# Patient Record
Sex: MALE | NOT HISPANIC OR LATINO | URBAN - METROPOLITAN AREA
[De-identification: names, ages, dates, MRNs, and addresses within clinical notes are randomized per-mention and may not be internally consistent; named-entity substitution may affect disease eponyms.]

---

## 2019-01-04 ENCOUNTER — INPATIENT (INPATIENT)
Facility: HOSPITAL | Age: 64
LOS: 11 days | Discharge: HOME | DRG: 955 | End: 2019-01-16
Attending: NEUROLOGICAL SURGERY | Admitting: PSYCHIATRY & NEUROLOGY
Payer: MEDICAID

## 2019-01-04 VITALS
DIASTOLIC BLOOD PRESSURE: 94 MMHG | OXYGEN SATURATION: 97 % | TEMPERATURE: 102 F | RESPIRATION RATE: 30 BRPM | SYSTOLIC BLOOD PRESSURE: 161 MMHG | HEART RATE: 92 BPM

## 2019-01-04 DIAGNOSIS — I61.9 NONTRAUMATIC INTRACEREBRAL HEMORRHAGE, UNSPECIFIED: ICD-10-CM

## 2019-01-04 LAB
ALBUMIN SERPL ELPH-MCNC: 4 G/DL — SIGNIFICANT CHANGE UP (ref 3.3–5)
ALP SERPL-CCNC: 41 U/L — SIGNIFICANT CHANGE UP (ref 40–120)
ALT FLD-CCNC: 44 U/L — SIGNIFICANT CHANGE UP (ref 10–45)
AMMONIA BLD-MCNC: 56 UMOL/L — HIGH (ref 11–55)
AMPHET UR-MCNC: NEGATIVE — SIGNIFICANT CHANGE UP
ANION GAP SERPL CALC-SCNC: 13 MMOL/L — SIGNIFICANT CHANGE UP (ref 5–17)
ANION GAP SERPL CALC-SCNC: 17 MMOL/L — SIGNIFICANT CHANGE UP (ref 5–17)
APPEARANCE UR: ABNORMAL
APTT BLD: 29.7 SEC — SIGNIFICANT CHANGE UP (ref 27.5–36.3)
AST SERPL-CCNC: 132 U/L — HIGH (ref 10–40)
BARBITURATES UR SCN-MCNC: NEGATIVE — SIGNIFICANT CHANGE UP
BENZODIAZ UR-MCNC: POSITIVE
BILIRUB DIRECT SERPL-MCNC: 0.3 MG/DL — HIGH (ref 0–0.2)
BILIRUB INDIRECT FLD-MCNC: 1.6 MG/DL — HIGH (ref 0.2–1)
BILIRUB SERPL-MCNC: 1.9 MG/DL — HIGH (ref 0.2–1.2)
BILIRUB UR-MCNC: NEGATIVE — SIGNIFICANT CHANGE UP
BLD GP AB SCN SERPL QL: NEGATIVE — SIGNIFICANT CHANGE UP
BUN SERPL-MCNC: 11 MG/DL — SIGNIFICANT CHANGE UP (ref 7–23)
BUN SERPL-MCNC: 12 MG/DL — SIGNIFICANT CHANGE UP (ref 7–23)
CALCIUM SERPL-MCNC: 8.4 MG/DL — SIGNIFICANT CHANGE UP (ref 8.4–10.5)
CALCIUM SERPL-MCNC: 8.4 MG/DL — SIGNIFICANT CHANGE UP (ref 8.4–10.5)
CHLORIDE SERPL-SCNC: 89 MMOL/L — LOW (ref 96–108)
CHLORIDE SERPL-SCNC: 90 MMOL/L — LOW (ref 96–108)
CK MB BLD-MCNC: 0.4 % — SIGNIFICANT CHANGE UP (ref 0–3.5)
CK MB CFR SERPL CALC: 10.9 NG/ML — HIGH (ref 0–6.7)
CK SERPL-CCNC: 2500 U/L — HIGH (ref 30–200)
CO2 SERPL-SCNC: 20 MMOL/L — LOW (ref 22–31)
CO2 SERPL-SCNC: 21 MMOL/L — LOW (ref 22–31)
COCAINE METAB.OTHER UR-MCNC: NEGATIVE — SIGNIFICANT CHANGE UP
COLOR SPEC: YELLOW — SIGNIFICANT CHANGE UP
CREAT SERPL-MCNC: 0.56 MG/DL — SIGNIFICANT CHANGE UP (ref 0.5–1.3)
CREAT SERPL-MCNC: 0.66 MG/DL — SIGNIFICANT CHANGE UP (ref 0.5–1.3)
DIFF PNL FLD: ABNORMAL
GLUCOSE SERPL-MCNC: 118 MG/DL — HIGH (ref 70–99)
GLUCOSE SERPL-MCNC: 131 MG/DL — HIGH (ref 70–99)
GLUCOSE UR QL: ABNORMAL
HCT VFR BLD CALC: 43.6 % — SIGNIFICANT CHANGE UP (ref 39–50)
HGB BLD-MCNC: 15.4 G/DL — SIGNIFICANT CHANGE UP (ref 13–17)
INR BLD: 1.06 RATIO — SIGNIFICANT CHANGE UP (ref 0.88–1.16)
KETONES UR-MCNC: ABNORMAL
LEUKOCYTE ESTERASE UR-ACNC: ABNORMAL
MAGNESIUM SERPL-MCNC: 2.1 MG/DL — SIGNIFICANT CHANGE UP (ref 1.6–2.6)
MCHC RBC-ENTMCNC: 33.6 PG — SIGNIFICANT CHANGE UP (ref 27–34)
MCHC RBC-ENTMCNC: 35.2 GM/DL — SIGNIFICANT CHANGE UP (ref 32–36)
MCV RBC AUTO: 95.3 FL — SIGNIFICANT CHANGE UP (ref 80–100)
METHADONE UR-MCNC: NEGATIVE — SIGNIFICANT CHANGE UP
NITRITE UR-MCNC: NEGATIVE — SIGNIFICANT CHANGE UP
OPIATES UR-MCNC: NEGATIVE — SIGNIFICANT CHANGE UP
OSMOLALITY UR: 780 MOS/KG — SIGNIFICANT CHANGE UP (ref 300–900)
OXYCODONE UR-MCNC: NEGATIVE — SIGNIFICANT CHANGE UP
PCP SPEC-MCNC: SIGNIFICANT CHANGE UP
PCP UR-MCNC: NEGATIVE — SIGNIFICANT CHANGE UP
PH UR: 6.5 — SIGNIFICANT CHANGE UP (ref 5–8)
PHOSPHATE SERPL-MCNC: 3.4 MG/DL — SIGNIFICANT CHANGE UP (ref 2.5–4.5)
PLATELET # BLD AUTO: 179 K/UL — SIGNIFICANT CHANGE UP (ref 150–400)
POTASSIUM SERPL-MCNC: 4 MMOL/L — SIGNIFICANT CHANGE UP (ref 3.5–5.3)
POTASSIUM SERPL-MCNC: 7.2 MMOL/L — CRITICAL HIGH (ref 3.5–5.3)
POTASSIUM SERPL-SCNC: 4 MMOL/L — SIGNIFICANT CHANGE UP (ref 3.5–5.3)
POTASSIUM SERPL-SCNC: 7.2 MMOL/L — CRITICAL HIGH (ref 3.5–5.3)
PROT SERPL-MCNC: 8.1 G/DL — SIGNIFICANT CHANGE UP (ref 6–8.3)
PROT UR-MCNC: ABNORMAL
PROTHROM AB SERPL-ACNC: 12.2 SEC — SIGNIFICANT CHANGE UP (ref 10–12.9)
RBC # BLD: 4.57 M/UL — SIGNIFICANT CHANGE UP (ref 4.2–5.8)
RBC # FLD: 12.2 % — SIGNIFICANT CHANGE UP (ref 10.3–14.5)
RH IG SCN BLD-IMP: POSITIVE — SIGNIFICANT CHANGE UP
RH IG SCN BLD-IMP: POSITIVE — SIGNIFICANT CHANGE UP
SODIUM SERPL-SCNC: 122 MMOL/L — LOW (ref 135–145)
SODIUM SERPL-SCNC: 128 MMOL/L — LOW (ref 135–145)
SODIUM UR-SCNC: 146 MMOL/L — SIGNIFICANT CHANGE UP
SP GR SPEC: 1.03 — HIGH (ref 1.01–1.02)
THC UR QL: NEGATIVE — SIGNIFICANT CHANGE UP
TROPONIN T, HIGH SENSITIVITY RESULT: 20 NG/L — SIGNIFICANT CHANGE UP (ref 0–51)
UROBILINOGEN FLD QL: ABNORMAL
WBC # BLD: 13.3 K/UL — HIGH (ref 3.8–10.5)
WBC # FLD AUTO: 13.3 K/UL — HIGH (ref 3.8–10.5)

## 2019-01-04 PROCEDURE — 99291 CRITICAL CARE FIRST HOUR: CPT

## 2019-01-04 PROCEDURE — 71045 X-RAY EXAM CHEST 1 VIEW: CPT | Mod: 26

## 2019-01-04 PROCEDURE — 70450 CT HEAD/BRAIN W/O DYE: CPT | Mod: 26

## 2019-01-04 PROCEDURE — 71250 CT THORAX DX C-: CPT | Mod: 26

## 2019-01-04 PROCEDURE — 36620 INSERTION CATHETER ARTERY: CPT

## 2019-01-04 PROCEDURE — 99292 CRITICAL CARE ADDL 30 MIN: CPT | Mod: 25

## 2019-01-04 PROCEDURE — 72125 CT NECK SPINE W/O DYE: CPT | Mod: 26

## 2019-01-04 PROCEDURE — 74176 CT ABD & PELVIS W/O CONTRAST: CPT | Mod: 26

## 2019-01-04 RX ORDER — MIDAZOLAM HYDROCHLORIDE 1 MG/ML
4 INJECTION, SOLUTION INTRAMUSCULAR; INTRAVENOUS ONCE
Qty: 0 | Refills: 0 | Status: DISCONTINUED | OUTPATIENT
Start: 2019-01-04 | End: 2019-01-04

## 2019-01-04 RX ORDER — FENTANYL CITRATE 50 UG/ML
50 INJECTION INTRAVENOUS ONCE
Qty: 0 | Refills: 0 | Status: DISCONTINUED | OUTPATIENT
Start: 2019-01-04 | End: 2019-01-04

## 2019-01-04 RX ORDER — LACOSAMIDE 50 MG/1
100 TABLET ORAL EVERY 12 HOURS
Qty: 0 | Refills: 0 | Status: DISCONTINUED | OUTPATIENT
Start: 2019-01-04 | End: 2019-01-06

## 2019-01-04 RX ORDER — SODIUM CHLORIDE 5 G/100ML
1000 INJECTION, SOLUTION INTRAVENOUS
Qty: 0 | Refills: 0 | Status: DISCONTINUED | OUTPATIENT
Start: 2019-01-04 | End: 2019-01-06

## 2019-01-04 RX ORDER — PROPOFOL 10 MG/ML
5 INJECTION, EMULSION INTRAVENOUS
Qty: 500 | Refills: 0 | Status: DISCONTINUED | OUTPATIENT
Start: 2019-01-04 | End: 2019-01-05

## 2019-01-04 RX ORDER — CEFAZOLIN SODIUM 1 G
2000 VIAL (EA) INJECTION ONCE
Qty: 0 | Refills: 0 | Status: COMPLETED | OUTPATIENT
Start: 2019-01-04 | End: 2019-01-04

## 2019-01-04 RX ORDER — ACETAMINOPHEN 500 MG
1000 TABLET ORAL ONCE
Qty: 0 | Refills: 0 | Status: COMPLETED | OUTPATIENT
Start: 2019-01-04 | End: 2019-01-04

## 2019-01-04 RX ORDER — CHLORHEXIDINE GLUCONATE 213 G/1000ML
1 SOLUTION TOPICAL
Qty: 0 | Refills: 0 | Status: DISCONTINUED | OUTPATIENT
Start: 2019-01-04 | End: 2019-01-07

## 2019-01-04 RX ORDER — DEXMEDETOMIDINE HYDROCHLORIDE IN 0.9% SODIUM CHLORIDE 4 UG/ML
0.7 INJECTION INTRAVENOUS
Qty: 200 | Refills: 0 | Status: DISCONTINUED | OUTPATIENT
Start: 2019-01-04 | End: 2019-01-05

## 2019-01-04 RX ORDER — VALPROIC ACID (AS SODIUM SALT) 250 MG/5ML
500 SOLUTION, ORAL ORAL EVERY 8 HOURS
Qty: 0 | Refills: 0 | Status: DISCONTINUED | OUTPATIENT
Start: 2019-01-04 | End: 2019-01-04

## 2019-01-04 RX ORDER — SODIUM CHLORIDE 9 MG/ML
1000 INJECTION INTRAMUSCULAR; INTRAVENOUS; SUBCUTANEOUS
Qty: 0 | Refills: 0 | Status: DISCONTINUED | OUTPATIENT
Start: 2019-01-04 | End: 2019-01-04

## 2019-01-04 RX ORDER — NICARDIPINE HYDROCHLORIDE 30 MG/1
5 CAPSULE, EXTENDED RELEASE ORAL
Qty: 40 | Refills: 0 | Status: DISCONTINUED | OUTPATIENT
Start: 2019-01-04 | End: 2019-01-05

## 2019-01-04 RX ADMIN — Medication 400 MILLIGRAM(S): at 17:45

## 2019-01-04 RX ADMIN — CHLORHEXIDINE GLUCONATE 1 APPLICATION(S): 213 SOLUTION TOPICAL at 22:24

## 2019-01-04 RX ADMIN — SODIUM CHLORIDE 75 MILLILITER(S): 5 INJECTION, SOLUTION INTRAVENOUS at 21:10

## 2019-01-04 RX ADMIN — FENTANYL CITRATE 50 MICROGRAM(S): 50 INJECTION INTRAVENOUS at 20:15

## 2019-01-04 RX ADMIN — Medication 27.5 MILLIGRAM(S): at 18:00

## 2019-01-04 RX ADMIN — LACOSAMIDE 120 MILLIGRAM(S): 50 TABLET ORAL at 21:11

## 2019-01-04 RX ADMIN — Medication 100 MILLIGRAM(S): at 20:15

## 2019-01-04 NOTE — PROGRESS NOTE ADULT - SUBJECTIVE AND OBJECTIVE BOX
SUMMARY:   63 year-old man with history of alcohol abuse was found wandering around attempting to enter a locked industrial facility on 1/4/18 and was taken to [facility] when he was noted to be confused. He was reported to be agitated, oriented only to self, no following commands but briskly moving all limbs. Imaging revealed multifocal contusions and subarachnoid hemorrhage. CTA revealed possible AVM so he was transferred to [facility] for further management. Of note, he received 8mg of lorazepam at OSH.     24 HOUR EVENTS:  Admitted.    VITALS/DATA/ORDERS: [x] Reviewed    EXAMINATION: T 38.7, SBP 150s on admission  Eyes closed, does not open, does not follow, when stimulated does get agitated and flails arms and legs, small but reactive pupils, all limbs move antigravity

## 2019-01-04 NOTE — H&P ADULT - NSHPPHYSICALEXAM_GEN_ALL_CORE
Eyes closed, does not open, does not follow, when stimulated does get agitated and flails arms and legs, small but reactive pupils, all limbs move antigravity

## 2019-01-04 NOTE — H&P ADULT - ASSESSMENT
ASSESSMENT/PLAN: Contusions (traumatic like pattern) and subarachnoid hemorrhage r/o vascular malformation; encephalopathy    NEURO:   - Review CTA - possible angio per NSGY ? vascular malformation   - Repeat CT head now to ensure hematoma volume stability  - NSGY consult  - Seizure prophylaxis - would favour VPA versus phenobarbital given history of agitation and EtOH abuse;   - Precedex PRN agitation  - EEG r/o subclinical seizures given contusions  - EtOH withdrawal monitoring  - check monitor LFTs/NH3/TSH/B12/Folate   -Thiamine folate MVI daily     CARDS:   - A-line  - BP control with PRN nicardipine gtt; goal -140mmHg    RESP:   -O2 sat > 92%    GI: NPO     RENAL: IVF with NS + K @ 75    ID:   - Febrile: f/u cultures, U/A, CXR     HEME:  hold DVT ppx 2/2 fresh IPH  SCDS    45 minutes critical care time    Patient critically ill at risk for death 2/2 worsening IPH, seziures, herniation

## 2019-01-04 NOTE — BRIEF OPERATIVE NOTE - PROCEDURE
<<-----Click on this checkbox to enter Procedure ICP (intracranial pressure) monitoring  01/04/2019    Active  [id number]

## 2019-01-04 NOTE — PROGRESS NOTE ADULT - SUBJECTIVE AND OBJECTIVE BOX
called to intubate this 63 year old.  On arrival, unresponsive.  Stable with Sp02 at 98%.  Hemodynamically stable.  Neck not "cleared" but looked OK per housestaff per CT.  Patient not in collar.    Intubated with propofol 100 mg and sux 100 mg.  Easy DL.  Tamela well.  Neck undisturbed, sp02 98%.  Pos easy cap.  Tamela well.  Tube at 23 cm at lips.    G. Palleschhi

## 2019-01-04 NOTE — PROCEDURE NOTE - NSPROCDETAILS_GEN_ALL_CORE
connected to a pressurized flush line/hemostasis with direct pressure, dressing applied/sutured in place/all materials/supplies accounted for at end of procedure/positive blood return obtained via catheter/location identified, draped/prepped, sterile technique used, needle inserted/introduced

## 2019-01-04 NOTE — PROGRESS NOTE ADULT - SUBJECTIVE AND OBJECTIVE BOX
SUMMARY:   63 year-old man with history of alcohol abuse was found wandering around attempting to enter a locked industrial facility on 1/4/18 and was taken to [facility] when he was noted to be confused. He was reported to be agitated, oriented only to self, no following commands but briskly moving all limbs. Imaging revealed multifocal contusions and subarachnoid hemorrhage. CTA revealed possible AVM so he was transferred to [facility] for further management. Of note, he received 8mg of lorazepam at OSH.     Repeat CTH following admission showing R frontal hematoma expansion w/ increased edema, intubated for airway protection, evaluated by neurosurgery for hematoma evacuation--no OR at this time    VITALS/DATA/ORDERS: [x] Reviewed    EXAMINATION:  intubated, sedated, PERRL 2mm b/l  +cough/gag  BUE LC, BLE WD  rrr no m/r/g  clear lungs  soft abd  wwp ext

## 2019-01-04 NOTE — H&P ADULT - HISTORY OF PRESENT ILLNESS
63 year-old man with history of alcohol abuse was found wandering around attempting to enter a locked industrial facility on 1/4/18 and was taken to [facility] when he was noted to be confused. He was reported to be agitated, oriented only to self, no following commands but briskly moving all limbs. Imaging revealed multifocal contusions and subarachnoid hemorrhage. CTA revealed possible AVM so he was transferred to [facility] for further management. Of note, he received 8mg of lorazepam at OSH.

## 2019-01-04 NOTE — PROGRESS NOTE ADULT - ASSESSMENT
ASSESSMENT/PLAN: Contusions (traumatic like pattern) and subarachnoid hemorrhage r/o vascular malformation; encephalopathy  IPH expansion on CTH; CTA reviewed, less likely AVM, no IR plan at this time    repeat CTH in am  1U PLT now given hx EtOH use and likely impaired hepatic function  adequate sedation  vimpat for seizure ppx x7d  vEEG monitoring x24 hrs  hyponatremia likely SIADH, on hypertonics for Na 140-150, however no more than 12mEq increase in 24hrs, Q6 BMP  monitor for EtOH withdrawal  -160  PRVC  NPO, f/u repeat LFT, RUQ US  fever w/u  maintain euglycemia  SCDs, hold chemo ppx IPH expansion    additional critical care time 60minutes

## 2019-01-04 NOTE — PROGRESS NOTE ADULT - ASSESSMENT
ASSESSMENT/PLAN: Contusions (traumatic like pattern) and subarachnoid hemorrhage r/o vascular malformation; encephalopathy  - Review CTA  - Repeat CT to ensure hematoma volume stability  - NSGY consult  - Seizure prophylaxis - would favour VPA versus phenobarbital given history of agitation and EtOH abuse; monitor LFTs/NH3  - Precedex PRN agitation  - EEG r/o subclinical seizures given contusions  - EtOH withdrawal monitoring  - A-line  - BP control with PRN nicardipine gtt; goal -140mmHg  - Febrile: f/u cultures    45 minutes critical care time

## 2019-01-05 ENCOUNTER — TRANSCRIPTION ENCOUNTER (OUTPATIENT)
Age: 64
End: 2019-01-05

## 2019-01-05 LAB
AMMONIA BLD-MCNC: 23 UMOL/L — SIGNIFICANT CHANGE UP (ref 11–55)
AMMONIA BLD-MCNC: 34 UMOL/L — SIGNIFICANT CHANGE UP (ref 11–55)
ANION GAP SERPL CALC-SCNC: 13 MMOL/L — SIGNIFICANT CHANGE UP (ref 5–17)
ANION GAP SERPL CALC-SCNC: 14 MMOL/L — SIGNIFICANT CHANGE UP (ref 5–17)
BUN SERPL-MCNC: 14 MG/DL — SIGNIFICANT CHANGE UP (ref 7–23)
BUN SERPL-MCNC: 15 MG/DL — SIGNIFICANT CHANGE UP (ref 7–23)
CALCIUM SERPL-MCNC: 8 MG/DL — LOW (ref 8.4–10.5)
CALCIUM SERPL-MCNC: 8.6 MG/DL — SIGNIFICANT CHANGE UP (ref 8.4–10.5)
CHLORIDE SERPL-SCNC: 101 MMOL/L — SIGNIFICANT CHANGE UP (ref 96–108)
CHLORIDE SERPL-SCNC: 103 MMOL/L — SIGNIFICANT CHANGE UP (ref 96–108)
CHOLEST SERPL-MCNC: 186 MG/DL — SIGNIFICANT CHANGE UP (ref 10–199)
CK MB BLD-MCNC: 0.4 % — SIGNIFICANT CHANGE UP (ref 0–3.5)
CK MB CFR SERPL CALC: 4.7 NG/ML — SIGNIFICANT CHANGE UP (ref 0–6.7)
CK SERPL-CCNC: 1079 U/L — HIGH (ref 30–200)
CK SERPL-CCNC: 1112 U/L — HIGH (ref 30–200)
CK SERPL-CCNC: 1152 U/L — HIGH (ref 30–200)
CO2 SERPL-SCNC: 19 MMOL/L — LOW (ref 22–31)
CO2 SERPL-SCNC: 20 MMOL/L — LOW (ref 22–31)
CREAT SERPL-MCNC: 0.66 MG/DL — SIGNIFICANT CHANGE UP (ref 0.5–1.3)
CREAT SERPL-MCNC: 0.7 MG/DL — SIGNIFICANT CHANGE UP (ref 0.5–1.3)
GAS PNL BLDA: SIGNIFICANT CHANGE UP
GAS PNL BLDA: SIGNIFICANT CHANGE UP
GLUCOSE SERPL-MCNC: 120 MG/DL — HIGH (ref 70–99)
GLUCOSE SERPL-MCNC: 122 MG/DL — HIGH (ref 70–99)
HBA1C BLD-MCNC: 5.9 % — HIGH (ref 4–5.6)
HCT VFR BLD CALC: 38.6 % — LOW (ref 39–50)
HDLC SERPL-MCNC: 72 MG/DL — SIGNIFICANT CHANGE UP
HGB BLD-MCNC: 13.5 G/DL — SIGNIFICANT CHANGE UP (ref 13–17)
LIPID PNL WITH DIRECT LDL SERPL: 95 MG/DL — SIGNIFICANT CHANGE UP
MAGNESIUM SERPL-MCNC: 2 MG/DL — SIGNIFICANT CHANGE UP (ref 1.6–2.6)
MAGNESIUM SERPL-MCNC: 2.2 MG/DL — SIGNIFICANT CHANGE UP (ref 1.6–2.6)
MCHC RBC-ENTMCNC: 33.7 PG — SIGNIFICANT CHANGE UP (ref 27–34)
MCHC RBC-ENTMCNC: 34.9 GM/DL — SIGNIFICANT CHANGE UP (ref 32–36)
MCV RBC AUTO: 96.5 FL — SIGNIFICANT CHANGE UP (ref 80–100)
PHOSPHATE SERPL-MCNC: 3.2 MG/DL — SIGNIFICANT CHANGE UP (ref 2.5–4.5)
PHOSPHATE SERPL-MCNC: 3.4 MG/DL — SIGNIFICANT CHANGE UP (ref 2.5–4.5)
PLATELET # BLD AUTO: 152 K/UL — SIGNIFICANT CHANGE UP (ref 150–400)
POTASSIUM SERPL-MCNC: 3.5 MMOL/L — SIGNIFICANT CHANGE UP (ref 3.5–5.3)
POTASSIUM SERPL-MCNC: 4 MMOL/L — SIGNIFICANT CHANGE UP (ref 3.5–5.3)
POTASSIUM SERPL-SCNC: 3.5 MMOL/L — SIGNIFICANT CHANGE UP (ref 3.5–5.3)
POTASSIUM SERPL-SCNC: 4 MMOL/L — SIGNIFICANT CHANGE UP (ref 3.5–5.3)
RBC # BLD: 4 M/UL — LOW (ref 4.2–5.8)
RBC # FLD: 11.7 % — SIGNIFICANT CHANGE UP (ref 10.3–14.5)
SODIUM SERPL-SCNC: 134 MMOL/L — LOW (ref 135–145)
SODIUM SERPL-SCNC: 136 MMOL/L — SIGNIFICANT CHANGE UP (ref 135–145)
T3 SERPL-MCNC: 81 NG/DL — SIGNIFICANT CHANGE UP (ref 80–200)
T4 AB SER-ACNC: 4.7 UG/DL — SIGNIFICANT CHANGE UP (ref 4.6–12)
TOTAL CHOLESTEROL/HDL RATIO MEASUREMENT: 2.6 RATIO — LOW (ref 3.4–9.6)
TRIGL SERPL-MCNC: 94 MG/DL — SIGNIFICANT CHANGE UP (ref 10–149)
TROPONIN T, HIGH SENSITIVITY RESULT: 19 NG/L — SIGNIFICANT CHANGE UP (ref 0–51)
TSH SERPL-MCNC: 1.28 UIU/ML — SIGNIFICANT CHANGE UP (ref 0.27–4.2)
WBC # BLD: 10 K/UL — SIGNIFICANT CHANGE UP (ref 3.8–10.5)
WBC # FLD AUTO: 10 K/UL — SIGNIFICANT CHANGE UP (ref 3.8–10.5)

## 2019-01-05 PROCEDURE — 71045 X-RAY EXAM CHEST 1 VIEW: CPT | Mod: 26,77

## 2019-01-05 PROCEDURE — 93306 TTE W/DOPPLER COMPLETE: CPT | Mod: 26

## 2019-01-05 PROCEDURE — 70450 CT HEAD/BRAIN W/O DYE: CPT | Mod: 26

## 2019-01-05 PROCEDURE — 99291 CRITICAL CARE FIRST HOUR: CPT

## 2019-01-05 PROCEDURE — 43752 NASAL/OROGASTRIC W/TUBE PLMT: CPT

## 2019-01-05 PROCEDURE — 93010 ELECTROCARDIOGRAM REPORT: CPT

## 2019-01-05 PROCEDURE — 99292 CRITICAL CARE ADDL 30 MIN: CPT

## 2019-01-05 PROCEDURE — 71045 X-RAY EXAM CHEST 1 VIEW: CPT | Mod: 26

## 2019-01-05 RX ORDER — OXYCODONE HYDROCHLORIDE 5 MG/1
5 TABLET ORAL EVERY 6 HOURS
Qty: 0 | Refills: 0 | Status: DISCONTINUED | OUTPATIENT
Start: 2019-01-05 | End: 2019-01-11

## 2019-01-05 RX ORDER — HYDRALAZINE HCL 50 MG
10 TABLET ORAL ONCE
Qty: 0 | Refills: 0 | Status: COMPLETED | OUTPATIENT
Start: 2019-01-05 | End: 2019-01-05

## 2019-01-05 RX ORDER — SODIUM CHLORIDE 9 MG/ML
500 INJECTION INTRAMUSCULAR; INTRAVENOUS; SUBCUTANEOUS ONCE
Qty: 0 | Refills: 0 | Status: COMPLETED | OUTPATIENT
Start: 2019-01-05 | End: 2019-01-05

## 2019-01-05 RX ORDER — CALCIUM GLUCONATE 100 MG/ML
2 VIAL (ML) INTRAVENOUS ONCE
Qty: 0 | Refills: 0 | Status: COMPLETED | OUTPATIENT
Start: 2019-01-05 | End: 2019-01-05

## 2019-01-05 RX ORDER — POTASSIUM CHLORIDE 20 MEQ
10 PACKET (EA) ORAL
Qty: 0 | Refills: 0 | Status: COMPLETED | OUTPATIENT
Start: 2019-01-05 | End: 2019-01-05

## 2019-01-05 RX ORDER — ENOXAPARIN SODIUM 100 MG/ML
40 INJECTION SUBCUTANEOUS
Qty: 0 | Refills: 0 | Status: DISCONTINUED | OUTPATIENT
Start: 2019-01-05 | End: 2019-01-12

## 2019-01-05 RX ORDER — INFLUENZA VIRUS VACCINE 15; 15; 15; 15 UG/.5ML; UG/.5ML; UG/.5ML; UG/.5ML
0.5 SUSPENSION INTRAMUSCULAR ONCE
Qty: 0 | Refills: 0 | Status: DISCONTINUED | OUTPATIENT
Start: 2019-01-05 | End: 2019-01-16

## 2019-01-05 RX ORDER — FOLIC ACID 0.8 MG
1 TABLET ORAL DAILY
Qty: 0 | Refills: 0 | Status: DISCONTINUED | OUTPATIENT
Start: 2019-01-05 | End: 2019-01-16

## 2019-01-05 RX ORDER — THIAMINE MONONITRATE (VIT B1) 100 MG
100 TABLET ORAL DAILY
Qty: 0 | Refills: 0 | Status: DISCONTINUED | OUTPATIENT
Start: 2019-01-05 | End: 2019-01-08

## 2019-01-05 RX ORDER — OXYCODONE HYDROCHLORIDE 5 MG/1
10 TABLET ORAL EVERY 6 HOURS
Qty: 0 | Refills: 0 | Status: DISCONTINUED | OUTPATIENT
Start: 2019-01-05 | End: 2019-01-11

## 2019-01-05 RX ADMIN — Medication 100 MILLIGRAM(S): at 11:31

## 2019-01-05 RX ADMIN — Medication 1 TABLET(S): at 11:31

## 2019-01-05 RX ADMIN — Medication 1 MILLIGRAM(S): at 11:30

## 2019-01-05 RX ADMIN — DEXMEDETOMIDINE HYDROCHLORIDE IN 0.9% SODIUM CHLORIDE 11.9 MICROGRAM(S)/KG/HR: 4 INJECTION INTRAVENOUS at 06:13

## 2019-01-05 RX ADMIN — OXYCODONE HYDROCHLORIDE 5 MILLIGRAM(S): 5 TABLET ORAL at 20:32

## 2019-01-05 RX ADMIN — CHLORHEXIDINE GLUCONATE 1 APPLICATION(S): 213 SOLUTION TOPICAL at 22:14

## 2019-01-05 RX ADMIN — SODIUM CHLORIDE 50 MILLILITER(S): 5 INJECTION, SOLUTION INTRAVENOUS at 06:13

## 2019-01-05 RX ADMIN — Medication 100 MILLIEQUIVALENT(S): at 05:03

## 2019-01-05 RX ADMIN — Medication 10 MILLIGRAM(S): at 18:41

## 2019-01-05 RX ADMIN — LACOSAMIDE 120 MILLIGRAM(S): 50 TABLET ORAL at 17:21

## 2019-01-05 RX ADMIN — SODIUM CHLORIDE 2000 MILLILITER(S): 9 INJECTION INTRAMUSCULAR; INTRAVENOUS; SUBCUTANEOUS at 22:14

## 2019-01-05 RX ADMIN — Medication 200 GRAM(S): at 06:21

## 2019-01-05 RX ADMIN — Medication 100 MILLIEQUIVALENT(S): at 07:24

## 2019-01-05 RX ADMIN — Medication 100 MILLIEQUIVALENT(S): at 06:26

## 2019-01-05 RX ADMIN — LACOSAMIDE 120 MILLIGRAM(S): 50 TABLET ORAL at 06:36

## 2019-01-05 RX ADMIN — SODIUM CHLORIDE 50 MILLILITER(S): 5 INJECTION, SOLUTION INTRAVENOUS at 11:31

## 2019-01-05 RX ADMIN — OXYCODONE HYDROCHLORIDE 5 MILLIGRAM(S): 5 TABLET ORAL at 21:25

## 2019-01-05 RX ADMIN — DEXMEDETOMIDINE HYDROCHLORIDE IN 0.9% SODIUM CHLORIDE 11.9 MICROGRAM(S)/KG/HR: 4 INJECTION INTRAVENOUS at 11:31

## 2019-01-05 RX ADMIN — ENOXAPARIN SODIUM 40 MILLIGRAM(S): 100 INJECTION SUBCUTANEOUS at 17:21

## 2019-01-05 NOTE — PROGRESS NOTE ADULT - SUBJECTIVE AND OBJECTIVE BOX
SUMMARY:   63 year-old man with history of alcohol abuse was found wandering around attempting to enter a locked industrial facility on 1/4/18 and was taken to [facility] when he was noted to be confused. He was reported to be agitated, oriented only to self, no following commands but briskly moving all limbs. Imaging revealed multifocal contusions and subarachnoid hemorrhage. CTA revealed possible AVM so he was transferred to [facility] for further management. Of note, he received 8mg of lorazepam at OSH.     Repeat CTH following admission showing R frontal hematoma expansion w/ increased edema, intubated for airway protection, evaluated by neurosurgery for hematoma evacuation--no OR at this time    extubated this am    VITALS/DATA/ORDERS: [x] Reviewed    EXAMINATION:  alert, oriented to self and month/year but not place  marginally compliant with exam, unable to asses for drift, but BUE/BLE full strength  rrr no m/r/g  clear lungs  soft abd  wwp ext

## 2019-01-05 NOTE — PROGRESS NOTE ADULT - ASSESSMENT
ASSESSMENT/PLAN: Contusions (traumatic like pattern) and subarachnoid hemorrhage r/o vascular malformation; encephalopathy  IPH expansion on CTH; CTA reviewed, less likely AVM, no IR plan at this time    vimpat for seizure ppx x7d  D/C ICP monitor in am and CTH  hyponatremia on 2%  monitor for EtOH withdrawal  -160  monitor respiratory status  NPO, f/u repeat LFT, RUQ US--cirrhosis  failed dysphagia screen, S/S eval  fever w/u--ucx  maintain euglycemia  SCDs, lovenox    additional critical care time 35minutes

## 2019-01-05 NOTE — PROGRESS NOTE ADULT - ASSESSMENT
63M here with Contusions (traumatic like pattern) and subarachnoid hemorrhage r/o vascular malformation, and SDH. Given CTH and h/o of possible likely fall.   - CTH in AM  - MOnitor ICP Goal <20  - Seizure prophylaxis per icu  - EEG r/o subclinical seizures given contusions  - EtOH withdrawal monitoring  - check monitor LFTs/NH3/TSH/B12/Folate   -Thiamine folate MVI daily

## 2019-01-05 NOTE — PROGRESS NOTE ADULT - SUBJECTIVE AND OBJECTIVE BOX
SUMMARY:   63 year-old man with history of alcohol abuse was found wandering around attempting to enter a locked industrial facility on 1/4/18 and was taken to [facility] when he was noted to be confused. He was reported to be agitated, oriented only to self, no following commands but briskly moving all limbs. Imaging revealed multifocal contusions and subarachnoid hemorrhage. CTA revealed possible AVM so he was transferred to [facility] for further management. Of note, he received 8mg of lorazepam at OSH.     Repeat CTH following admission showing R frontal hematoma expansion w/ increased edema, intubated for airway protection, evaluated by neurosurgery for hematoma evacuation--no OR at this time    VITALS/DATA/ORDERS: [x] Reviewed    EXAMINATION:  intubated, sedated, PERRL 2mm b/l  +cough/gag  BUE LC, BLE WD  rrr no m/r/g  clear lungs  soft abd  wwp ext SUMMARY:   63 year-old man with history of alcohol abuse was found wandering around attempting to enter a locked industrial facility on 1/4/18 and was taken to [facility] when he was noted to be confused. He was reported to be agitated, oriented only to self, no following commands but briskly moving all limbs. Imaging revealed multifocal contusions and subarachnoid hemorrhage. CTA revealed possible AVM so he was transferred to [facility] for further management. Of note, he received 8mg of lorazepam at OSH.     Repeat CTH following admission showing R frontal hematoma expansion w/ increased edema, intubated for airway protection, evaluated by neurosurgery for hematoma evacuation--no OR at this time    VITALS/DATA/ORDERS: [x] Reviewed    EXAMINATION:  intubated, holding precedex - FC, opens eyes, sticks out tongue, EOMI, PERRL, moves all extremities on commands equally   rrr no m/r/g  clear lungs  soft abd  wwp ext

## 2019-01-05 NOTE — CHART NOTE - NSCHARTNOTEFT_GEN_A_CORE
NG tube was placed, using standard ICU protocol, for  for medication use and feeding after patient failed dysphagia. Patient tolerated procedure well.  Proper NGT placement was confirmed with Chest X ray

## 2019-01-05 NOTE — PROGRESS NOTE ADULT - ASSESSMENT
ASSESSMENT/PLAN: Contusions (traumatic like pattern) and subarachnoid hemorrhage r/o vascular malformation; encephalopathy  IPH expansion on CTH; CTA reviewed, less likely AVM, no IR plan at this time    NEURO:   Repeat CTH this am  Adequate Sedation  TBI?: vimpat for seizure ppx x7d,  vEEG monitoring x 24 hrs  monitor for EtOH withdrawal    PULM: PRVC, CXR, ABG     CV: -160    RENAL:  hyponatremia likely SIADH, on hypertonics for Na 140-150, however no more than 12mEq increase in 24hrs, Q6 BMP    GI:  NPO, f/u repeat LFT, RUQ US    ID: fever w/u    ENDO: maintain euglycemia    HEME:  1U PLT now given hx EtOH use and likely impaired hepatic function  SCDs, hold chemo ppx IPH expansion    additional critical care time 45 minutes ASSESSMENT/PLAN: Contusions (traumatic like pattern) and subarachnoid hemorrhage r/o vascular malformation; encephalopathy  IPH expansion on CTH; CTA reviewed, less likely AVM, no IR plan at this time    NEURO:   Repeat CTH this am  No surgical intervention as per NSGY   hold Sedation  TBI: CPP 60-80, ICP < 20, vimpat for seizure ppx x7d,   monitor for EtOH withdrawal - CIWA     PULM: CPAP to extubate, CXR, ABG     CV: -160    RENAL:  hyponatremia likely SIADH, on hypertonics for Na 140-150, however no more than 12mEq increase in 24hrs, Q8 BMP    GI:  NPO for extubation, dysphagia eval, f/u repeat LFT, RUQ US    ID: fever w/u - U/A positive (patient had yip from outside facility) - f/u Cx's before starting Abx    ENDO: maintain euglycemia    HEME:  s/p 1U PLT given hx EtOH use and likely impaired hepatic function, trend CBC   SCDs, hold chemo ppx IPH expansion    additional critical care time 45 minutes ASSESSMENT/PLAN: Contusions (traumatic like pattern) and subarachnoid hemorrhage r/o vascular malformation; encephalopathy  IPH expansion on CTH; CTA reviewed, less likely AVM, no IR plan at this time    NEURO:   Repeat CTH this am  No surgical intervention as per NSGY   hold Sedation  TBI: CPP 60-80, ICP < 20, vimpat for seizure ppx x7d,   monitor for EtOH withdrawal - CIWA     PULM: CPAP to extubate, CXR, ABG     CV: -160    RENAL:  hyponatremia likely SIADH, on hypertonics for Na 140-150, however no more than 12mEq increase in 24hrs, Q8 BMP    GI:  NPO for extubation, dysphagia eval, f/u repeat LFT, RUQ US    ID: fever w/u - U/A positive (patient had yip from outside facility) - f/u Cx's before starting Abx    ENDO: maintain euglycemia    HEME:  s/p 1U PLT given hx EtOH use and likely impaired hepatic function, trend CBC   SCDs, hold chemo ppx IPH expansion    Patient was critically ill with high risk of neurologic deterioration and/or death due to intracranial hypertension.    45 minutes spent.

## 2019-01-05 NOTE — PROGRESS NOTE ADULT - SUBJECTIVE AND OBJECTIVE BOX
Visit Summary: Patient seen and evaluated at bedside.    Overnight Events: ICP monitor placed    Exam:  T(C): 37.9 (01-04-19 @ 19:00), Max: 38.7 (01-04-19 @ 17:25)  HR: 87 (01-04-19 @ 23:00) (71 - 92)  BP: 139/90 (01-04-19 @ 23:00) (78/59 - 161/94)  RR: 17 (01-04-19 @ 23:00) (12 - 30)  SpO2: 100% (01-04-19 @ 23:00) (96% - 100%)  Wt(kg): --    Intubated, PERRL, +corneals/cough/gag  No EO  BUE localizing  BLE spont                        15.4   13.3  )-----------( 179      ( 04 Jan 2019 18:17 )             43.6     01-04    128<L>  |  90<L>  |  12  ----------------------------<  131<H>  4.0   |  21<L>  |  0.66    Ca    8.4      04 Jan 2019 19:50  Phos  3.4     01-04  Mg     2.1     01-04    TPro  8.1  /  Alb  4.0  /  TBili  1.9<H>  /  DBili  0.3<H>  /  AST  132<H>  /  ALT  44  /  AlkPhos  41  01-04  PT/INR - ( 04 Jan 2019 18:17 )   PT: 12.2 sec;   INR: 1.06 ratio         PTT - ( 04 Jan 2019 18:17 )  PTT:29.7 sec

## 2019-01-05 NOTE — AIRWAY REMOVAL NOTE  ADULT & PEDS - ARTIFICAL AIRWAY REMOVAL COMMENTS
Patient extubated without complications. Written order for extubation was verified. The patient was identified by full name and birthdate compared to the ID band. Present during the procedure was FRANCINE Temple

## 2019-01-06 LAB
ALBUMIN SERPL ELPH-MCNC: 3.5 G/DL — SIGNIFICANT CHANGE UP (ref 3.3–5)
ALP SERPL-CCNC: 49 U/L — SIGNIFICANT CHANGE UP (ref 40–120)
ALT FLD-CCNC: 38 U/L — SIGNIFICANT CHANGE UP (ref 10–45)
ANION GAP SERPL CALC-SCNC: 15 MMOL/L — SIGNIFICANT CHANGE UP (ref 5–17)
ANION GAP SERPL CALC-SCNC: 16 MMOL/L — SIGNIFICANT CHANGE UP (ref 5–17)
AST SERPL-CCNC: 67 U/L — HIGH (ref 10–40)
BILIRUB SERPL-MCNC: 1.6 MG/DL — HIGH (ref 0.2–1.2)
BUN SERPL-MCNC: 10 MG/DL — SIGNIFICANT CHANGE UP (ref 7–23)
BUN SERPL-MCNC: 13 MG/DL — SIGNIFICANT CHANGE UP (ref 7–23)
CALCIUM SERPL-MCNC: 8.4 MG/DL — SIGNIFICANT CHANGE UP (ref 8.4–10.5)
CALCIUM SERPL-MCNC: 8.5 MG/DL — SIGNIFICANT CHANGE UP (ref 8.4–10.5)
CHLORIDE SERPL-SCNC: 100 MMOL/L — SIGNIFICANT CHANGE UP (ref 96–108)
CHLORIDE SERPL-SCNC: 102 MMOL/L — SIGNIFICANT CHANGE UP (ref 96–108)
CO2 SERPL-SCNC: 18 MMOL/L — LOW (ref 22–31)
CO2 SERPL-SCNC: 20 MMOL/L — LOW (ref 22–31)
CREAT SERPL-MCNC: 0.56 MG/DL — SIGNIFICANT CHANGE UP (ref 0.5–1.3)
CREAT SERPL-MCNC: 0.61 MG/DL — SIGNIFICANT CHANGE UP (ref 0.5–1.3)
CULTURE RESULTS: NO GROWTH — SIGNIFICANT CHANGE UP
GLUCOSE SERPL-MCNC: 154 MG/DL — HIGH (ref 70–99)
GLUCOSE SERPL-MCNC: 98 MG/DL — SIGNIFICANT CHANGE UP (ref 70–99)
MAGNESIUM SERPL-MCNC: 2 MG/DL — SIGNIFICANT CHANGE UP (ref 1.6–2.6)
MAGNESIUM SERPL-MCNC: 2 MG/DL — SIGNIFICANT CHANGE UP (ref 1.6–2.6)
PHOSPHATE SERPL-MCNC: 3.1 MG/DL — SIGNIFICANT CHANGE UP (ref 2.5–4.5)
POTASSIUM SERPL-MCNC: 3.3 MMOL/L — LOW (ref 3.5–5.3)
POTASSIUM SERPL-MCNC: 3.8 MMOL/L — SIGNIFICANT CHANGE UP (ref 3.5–5.3)
POTASSIUM SERPL-SCNC: 3.3 MMOL/L — LOW (ref 3.5–5.3)
POTASSIUM SERPL-SCNC: 3.8 MMOL/L — SIGNIFICANT CHANGE UP (ref 3.5–5.3)
PROT SERPL-MCNC: 6.7 G/DL — SIGNIFICANT CHANGE UP (ref 6–8.3)
SODIUM SERPL-SCNC: 135 MMOL/L — SIGNIFICANT CHANGE UP (ref 135–145)
SODIUM SERPL-SCNC: 136 MMOL/L — SIGNIFICANT CHANGE UP (ref 135–145)
SPECIMEN SOURCE: SIGNIFICANT CHANGE UP

## 2019-01-06 PROCEDURE — 99292 CRITICAL CARE ADDL 30 MIN: CPT

## 2019-01-06 PROCEDURE — 70450 CT HEAD/BRAIN W/O DYE: CPT | Mod: 26

## 2019-01-06 PROCEDURE — 99291 CRITICAL CARE FIRST HOUR: CPT

## 2019-01-06 RX ORDER — MIDAZOLAM HYDROCHLORIDE 1 MG/ML
2 INJECTION, SOLUTION INTRAMUSCULAR; INTRAVENOUS ONCE
Qty: 0 | Refills: 0 | Status: DISCONTINUED | OUTPATIENT
Start: 2019-01-06 | End: 2019-01-06

## 2019-01-06 RX ORDER — POTASSIUM CHLORIDE 20 MEQ
40 PACKET (EA) ORAL EVERY 4 HOURS
Qty: 0 | Refills: 0 | Status: COMPLETED | OUTPATIENT
Start: 2019-01-06 | End: 2019-01-07

## 2019-01-06 RX ORDER — QUETIAPINE FUMARATE 200 MG/1
12.5 TABLET, FILM COATED ORAL
Qty: 0 | Refills: 0 | Status: DISCONTINUED | OUTPATIENT
Start: 2019-01-06 | End: 2019-01-11

## 2019-01-06 RX ORDER — DOXAZOSIN MESYLATE 4 MG
8 TABLET ORAL AT BEDTIME
Qty: 0 | Refills: 0 | Status: DISCONTINUED | OUTPATIENT
Start: 2019-01-06 | End: 2019-01-16

## 2019-01-06 RX ORDER — LACOSAMIDE 50 MG/1
100 TABLET ORAL
Qty: 0 | Refills: 0 | Status: DISCONTINUED | OUTPATIENT
Start: 2019-01-06 | End: 2019-01-06

## 2019-01-06 RX ORDER — CEFTRIAXONE 500 MG/1
INJECTION, POWDER, FOR SOLUTION INTRAMUSCULAR; INTRAVENOUS
Qty: 0 | Refills: 0 | Status: DISCONTINUED | OUTPATIENT
Start: 2019-01-06 | End: 2019-01-06

## 2019-01-06 RX ORDER — POTASSIUM CHLORIDE 20 MEQ
20 PACKET (EA) ORAL ONCE
Qty: 0 | Refills: 0 | Status: COMPLETED | OUTPATIENT
Start: 2019-01-06 | End: 2019-01-06

## 2019-01-06 RX ORDER — LACOSAMIDE 50 MG/1
100 TABLET ORAL
Qty: 0 | Refills: 0 | Status: DISCONTINUED | OUTPATIENT
Start: 2019-01-06 | End: 2019-01-11

## 2019-01-06 RX ORDER — AMLODIPINE BESYLATE 2.5 MG/1
10 TABLET ORAL DAILY
Qty: 0 | Refills: 0 | Status: DISCONTINUED | OUTPATIENT
Start: 2019-01-06 | End: 2019-01-16

## 2019-01-06 RX ORDER — DOXAZOSIN MESYLATE 4 MG
2 TABLET ORAL AT BEDTIME
Qty: 0 | Refills: 0 | Status: DISCONTINUED | OUTPATIENT
Start: 2019-01-06 | End: 2019-01-06

## 2019-01-06 RX ORDER — CEFTRIAXONE 500 MG/1
1 INJECTION, POWDER, FOR SOLUTION INTRAMUSCULAR; INTRAVENOUS ONCE
Qty: 0 | Refills: 0 | Status: COMPLETED | OUTPATIENT
Start: 2019-01-06 | End: 2019-01-06

## 2019-01-06 RX ORDER — TAMSULOSIN HYDROCHLORIDE 0.4 MG/1
0.4 CAPSULE ORAL AT BEDTIME
Qty: 0 | Refills: 0 | Status: DISCONTINUED | OUTPATIENT
Start: 2019-01-06 | End: 2019-01-06

## 2019-01-06 RX ORDER — FENTANYL CITRATE 50 UG/ML
25 INJECTION INTRAVENOUS ONCE
Qty: 0 | Refills: 0 | Status: DISCONTINUED | OUTPATIENT
Start: 2019-01-06 | End: 2019-01-06

## 2019-01-06 RX ORDER — DEXMEDETOMIDINE HYDROCHLORIDE IN 0.9% SODIUM CHLORIDE 4 UG/ML
0.3 INJECTION INTRAVENOUS
Qty: 200 | Refills: 0 | Status: DISCONTINUED | OUTPATIENT
Start: 2019-01-06 | End: 2019-01-06

## 2019-01-06 RX ADMIN — FENTANYL CITRATE 25 MICROGRAM(S): 50 INJECTION INTRAVENOUS at 08:00

## 2019-01-06 RX ADMIN — Medication 1 MILLIGRAM(S): at 12:32

## 2019-01-06 RX ADMIN — AMLODIPINE BESYLATE 10 MILLIGRAM(S): 2.5 TABLET ORAL at 21:07

## 2019-01-06 RX ADMIN — LACOSAMIDE 100 MILLIGRAM(S): 50 TABLET ORAL at 05:14

## 2019-01-06 RX ADMIN — Medication 2 MILLIGRAM(S): at 04:39

## 2019-01-06 RX ADMIN — CEFTRIAXONE 100 GRAM(S): 500 INJECTION, POWDER, FOR SOLUTION INTRAMUSCULAR; INTRAVENOUS at 10:00

## 2019-01-06 RX ADMIN — LACOSAMIDE 100 MILLIGRAM(S): 50 TABLET ORAL at 17:14

## 2019-01-06 RX ADMIN — QUETIAPINE FUMARATE 12.5 MILLIGRAM(S): 200 TABLET, FILM COATED ORAL at 13:37

## 2019-01-06 RX ADMIN — LACOSAMIDE 100 MILLIGRAM(S): 50 TABLET ORAL at 21:09

## 2019-01-06 RX ADMIN — Medication 40 MILLIEQUIVALENT(S): at 21:07

## 2019-01-06 RX ADMIN — Medication 1 TABLET(S): at 12:32

## 2019-01-06 RX ADMIN — MIDAZOLAM HYDROCHLORIDE 2 MILLIGRAM(S): 1 INJECTION, SOLUTION INTRAMUSCULAR; INTRAVENOUS at 08:00

## 2019-01-06 RX ADMIN — Medication 8 MILLIGRAM(S): at 21:08

## 2019-01-06 RX ADMIN — OXYCODONE HYDROCHLORIDE 5 MILLIGRAM(S): 5 TABLET ORAL at 04:20

## 2019-01-06 RX ADMIN — Medication 20 MILLIEQUIVALENT(S): at 04:19

## 2019-01-06 RX ADMIN — CHLORHEXIDINE GLUCONATE 1 APPLICATION(S): 213 SOLUTION TOPICAL at 21:08

## 2019-01-06 RX ADMIN — QUETIAPINE FUMARATE 12.5 MILLIGRAM(S): 200 TABLET, FILM COATED ORAL at 21:07

## 2019-01-06 RX ADMIN — Medication 100 MILLIGRAM(S): at 12:32

## 2019-01-06 RX ADMIN — OXYCODONE HYDROCHLORIDE 5 MILLIGRAM(S): 5 TABLET ORAL at 04:45

## 2019-01-06 RX ADMIN — ENOXAPARIN SODIUM 40 MILLIGRAM(S): 100 INJECTION SUBCUTANEOUS at 17:14

## 2019-01-06 NOTE — CHART NOTE - NSCHARTNOTEFT_GEN_A_CORE
TYRA MISHRA [id number]      Drain type: Frontal ICP [place]      Patient's position while drain removed: Flat in bed     [x] Patient tolerated well [x] No complications [] complications:     Exit Site secured with: [x] _4_ staples

## 2019-01-06 NOTE — DISCHARGE NOTE ADULT - MEDICATION SUMMARY - MEDICATIONS TO TAKE
I will START or STAY ON the medications listed below when I get home from the hospital:    acetaminophen 500 mg oral tablet  -- 2 tab(s) by mouth every 6 hours, As Needed - for headache  -- Indication: For Headaches     amLODIPine 10 mg oral tablet  -- 1 tab(s) by mouth once a day  -- Indication: For Hypertension    Nicotrol Inhaler 10 mg inhalation device  -- 1  inhaled 2 times a day  -- Indication: For Smoker    Multiple Vitamins oral tablet  -- 1 tab(s) by mouth once a day  -- Indication: For Supplement

## 2019-01-06 NOTE — DISCHARGE NOTE ADULT - NS AS DC STROKE ED MATERIALS
Call 911 for Stroke/Stroke Education Booklet/Risk Factors for Stroke/Prescribed Medications/Need for Followup After Discharge/Stroke Warning Signs and Symptoms

## 2019-01-06 NOTE — PROGRESS NOTE ADULT - SUBJECTIVE AND OBJECTIVE BOX
Visit Summary: Patient seen and evaluated at bedside. He was extubated yesterday afternoon and is now Ox2 and moving everything well. ICPs have remained under 10.    Overnight Events: no acute events o/n    Exam:  T(C): 37.9 (01-05-19 @ 23:00), Max: 38.5 (01-05-19 @ 19:00)  HR: 64 (01-05-19 @ 23:00) (53 - 76)  BP: 152/88 (01-05-19 @ 23:00) (119/77 - 158/88)  RR: 14 (01-05-19 @ 23:00) (10 - 29)  SpO2: 100% (01-05-19 @ 23:00) (95% - 100%)  Wt(kg): --    AAOx2, EOS, FC  PERRL, EOMI  FOX 5/5, no drift  incision c/d/i                          13.5   10.0  )-----------( 152      ( 05 Jan 2019 04:15 )             38.6     01-05    136  |  103  |  15  ----------------------------<  122<H>  4.0   |  20<L>  |  0.66    Ca    8.6      05 Jan 2019 11:39  Phos  3.2     01-05  Mg     2.2     01-05    TPro  8.1  /  Alb  4.0  /  TBili  1.9<H>  /  DBili  0.3<H>  /  AST  132<H>  /  ALT  44  /  AlkPhos  41  01-04  PT/INR - ( 04 Jan 2019 18:17 )   PT: 12.2 sec;   INR: 1.06 ratio         PTT - ( 04 Jan 2019 18:17 )  PTT:29.7 sec

## 2019-01-06 NOTE — PROGRESS NOTE ADULT - SUBJECTIVE AND OBJECTIVE BOX
SUMMARY:   63 year-old man with history of alcohol abuse was found wandering around attempting to enter a locked industrial facility on 1/4/18 and was taken to [facility] when he was noted to be confused. He was reported to be agitated, oriented only to self, no following commands but briskly moving all limbs. Imaging revealed multifocal contusions and subarachnoid hemorrhage. CTA revealed possible AVM so he was transferred to [facility] for further management. Of note, he received 8mg of lorazepam at OSH.     Repeat CTH following admission showing R frontal hematoma expansion w/ increased edema, intubated for airway protection, evaluated by neurosurgery for hematoma evacuation--no OR at this time    1/5 extubated     ICP monitor d/c'ed, post CTH stable, off precedex now    VITALS/DATA/ORDERS: [x] Reviewed    EXAMINATION:  alert, oriented to self and month/year, intermittently to place  marginally compliant with exam, unable to asses for drift, but BUE/BLE full strength  rrr no m/r/g  clear lungs  soft abd  wwp ext

## 2019-01-06 NOTE — DISCHARGE NOTE ADULT - CARE PROVIDERS DIRECT ADDRESSES
[email or web address]

## 2019-01-06 NOTE — PROGRESS NOTE ADULT - ASSESSMENT
ASSESSMENT/PLAN: Contusions (traumatic like pattern) and subarachnoid hemorrhage r/o vascular malformation; encephalopathy  IPH expansion on CTH; CTA reviewed, less likely AVM, no IR plan at this time    NEURO:   jett d/c'ed this AM, post-pull CT stable, evolving heme/mass effect  vimpat for seizure ppx x7d,   monitor for EtOH withdrawal - CIWA     PULM: sat > 92%    CV: -160    RENAL:  IVL; goal Na 135-145    GI:  failed dysphagia, pending speech/swallow, NGT in, start jevity    ID: continues to spike fevers, + UA, + urinary retention -- start ceftriaxone, awaiting cultures    ENDO: maintain euglycemia    HEME:  monitor H/H, SQL    Patient was critically ill with high risk of neurologic deterioration and/or death due to intracranial hemorrhage.    45 minutes spent.

## 2019-01-06 NOTE — DISCHARGE NOTE ADULT - PLAN OF CARE
s/p ICP bolt, intubated, bolt discontinued 1/6/19 No strenous activity. No heavy lifting. Do not return to work until cleared by physician. No driving until cleared by physician. Follow up at medical clinic   Continue norvasc for BP control 1200 ml water restriction/   Repeat blood work in 1-2 weeks Right upper pole hypodense lesion likely simple cyst Repeat imaging Right renal lesion Family support. encourage abstinence from alcohol

## 2019-01-06 NOTE — DISCHARGE NOTE ADULT - COMMUNITY RESOURCES
[facility] Appt - [phone], Appointment on: Wednesday 1/23/19 @ 10:30AM with Dr Paul Camacho

## 2019-01-06 NOTE — PROGRESS NOTE ADULT - ASSESSMENT
ASSESSMENT/PLAN: Contusions (traumatic like pattern) and subarachnoid hemorrhage r/o vascular malformation; encephalopathy  IPH expansion on CTH; CTA reviewed, less likely AVM, no IR plan at this time    vimpat for seizure ppx x7d  monitor for EtOH withdrawal  -160  monitor respiratory status  failed dysphagia screen, S/S eval  tube feeding  IVL  fever w/u--ucx--negative, d/c abx  maintain euglycemia  SCDs, lovenox    additional critical care time 35minutes

## 2019-01-06 NOTE — DISCHARGE NOTE ADULT - CARE PLAN
Principal Discharge DX:	Cerebral contusion  Goal:	s/p ICP bolt, intubated, bolt discontinued 1/6/19  Assessment and plan of treatment:	No strenous activity. No heavy lifting. Do not return to work until cleared by physician. No driving until cleared by physician.  Secondary Diagnosis:	Left ventricular dysfunction  Assessment and plan of treatment:	Follow up at medical clinic   [facility] for BP control  Secondary Diagnosis:	Hyponatremia  Assessment and plan of treatment:	1200 ml water restriction/   Repeat blood work in 1-2 weeks  Secondary Diagnosis:	Renal lesion  Goal:	Right upper pole hypodense lesion likely simple cyst  Assessment and plan of treatment:	Repeat imaging Right renal lesion  Secondary Diagnosis:	Alcohol abuse  Assessment and plan of treatment:	Family support. encourage abstinence from alcohol

## 2019-01-06 NOTE — PROGRESS NOTE ADULT - SUBJECTIVE AND OBJECTIVE BOX
SUMMARY:   63 year-old man with history of alcohol abuse was found wandering around attempting to enter a locked industrial facility on 1/4/18 and was taken to [facility] when he was noted to be confused. He was reported to be agitated, oriented only to self, no following commands but briskly moving all limbs. Imaging revealed multifocal contusions and subarachnoid hemorrhage. CTA revealed possible AVM so he was transferred to [facility] for further management. Of note, he received 8mg of lorazepam at OSH.     Repeat CTH following admission showing R frontal hematoma expansion w/ increased edema, intubated for airway protection, evaluated by neurosurgery for hematoma evacuation--no OR at this time    extubated this am    VITALS:  T(C): , Max: 38.5 (01-05-19 @ 19:00)  HR:  (55 - 84)  BP:  (132/82 - 164/94)  ABP:  (127/61 - 167/75)  RR:  (10 - 29)  SpO2:  (95% - 100%)  Wt(kg): --      01-05-19 @ 07:01  -  01-06-19 @ 07:00  --------------------------------------------------------  IN: 1122.6 mL / OUT: 2095 mL / NET: -972.4 mL    01-06-19 @ 07:01 - 01-06-19 @ 10:53  --------------------------------------------------------  IN: 66.6 mL / OUT: 0 mL / NET: 66.6 mL      LABS:  Na: 136 (01-05 @ 20:40), 136 (01-05 @ 11:39), 134 (01-05 @ 04:15), 128 (01-04 @ 19:50), 122 (01-04 @ 18:17)  K: 3.8 (01-05 @ 20:40), 4.0 (01-05 @ 11:39), 3.5 (01-05 @ 04:15), 4.0 (01-04 @ 19:50), 7.2 (01-04 @ 18:17)  Cl: 102 (01-05 @ 20:40), 103 (01-05 @ 11:39), 101 (01-05 @ 04:15), 90 (01-04 @ 19:50), 89 (01-04 @ 18:17)  CO2: 18 (01-05 @ 20:40), 20 (01-05 @ 11:39), 19 (01-05 @ 04:15), 21 (01-04 @ 19:50), 20 (01-04 @ 18:17)  BUN: 13 (01-05 @ 20:40), 15 (01-05 @ 11:39), 14 (01-05 @ 04:15), 12 (01-04 @ 19:50), 11 (01-04 @ 18:17)  Cr: 0.61 (01-05 @ 20:40), 0.66 (01-05 @ 11:39), 0.70 (01-05 @ 04:15), 0.66 (01-04 @ 19:50), 0.56 (01-04 @ 18:17)  Glu: 98(01-05 @ 20:40), 122(01-05 @ 11:39), 120(01-05 @ 04:15), 131(01-04 @ 19:50), 118(01-04 @ 18:17)    Hgb: 13.5 (01-05 @ 04:15), 15.4 (01-04 @ 18:17)  Hct: 38.6 (01-05 @ 04:15), 43.6 (01-04 @ 18:17)  WBC: 10.0 (01-05 @ 04:15), 13.3 (01-04 @ 18:17)  Plt: 152 (01-05 @ 04:15), 179 (01-04 @ 18:17)    INR: 1.06 01-04-19 @ 18:17  PTT: 29.7 01-04-19 @ 18:17    IMAGING:   Recent imaging studies were reviewed.    MEDICATIONS:  cefTRIAXone   IVPB      chlorhexidine 4% Liquid 1 Application(s) Topical <User Schedule>  dexmedetomidine Infusion 0.3 MICROgram(s)/kG/Hr IV Continuous <Continuous>  doxazosin 2 milliGRAM(s) Oral at bedtime  enoxaparin Injectable 40 milliGRAM(s) SubCutaneous <User Schedule>  folic acid 1 milliGRAM(s) Oral daily  influenza   Vaccine 0.5 milliLiter(s) IntraMuscular once  lacosamide 100 milliGRAM(s) Oral two times a day  multivitamin 1 Tablet(s) Oral daily  oxyCODONE    IR 5 milliGRAM(s) Oral every 6 hours PRN  oxyCODONE    IR 10 milliGRAM(s) Oral every 6 hours PRN  thiamine 100 milliGRAM(s) Oral daily    EXAMINATION:  alert, oriented to self and month/year but not place  marginally compliant with exam, unable to asses for drift, but BUE/BLE full strength  rrr no m/r/g  clear lungs  soft abd  wwp ext

## 2019-01-06 NOTE — DISCHARGE NOTE ADULT - PATIENT PORTAL LINK FT
You can access the [facility] Patient Portal, offered by [facility], by registering with the following website: [email or web address]

## 2019-01-06 NOTE — DISCHARGE NOTE ADULT - HOSPITAL COURSE
63M w/alcohol abuse has traumatic SDH SAH and bilateral frontal/ temporal brain contusions  s/p ICP bolt, intubated, bolt discontinued 1/6/19  course c/b agitation, delirium related alcohol withdrawal, encephalopathy,  fevers, hyponatremia. Mild LV dysfunction on cardiac echo. CT abdomen with Right upper pole hypodense lesion likely simple cyst. Repeat head CT stable with resolving contusions. Mentals status continued to improve.  Hyponatremia treated with hypertonic saline and tapered off. Remains on free water restriction. Seen and followed by medicine inhouse. Evaluated and treated  by PT/ OT and recommended for Home PT/ assist with all functional activities. 63M w/alcohol abuse has traumatic SDH SAH and bilateral frontal/ temporal brain contusions  s/p ICP bolt, intubated, bolt discontinued 1/6/19  course c/b agitation, delirium related alcohol withdrawal, encephalopathy,  fevers, hyponatremia. Mild LV dysfunction on cardiac echo. CT abdomen with Right upper pole hypodense lesion likely simple cyst. Repeat head CT stable with resolving contusions. Mental status continued to improve.  Hyponatremia treated with hypertonic saline and tapered off. Remains on free water restriction. Seen and followed by medicine inhouse. Evaluated and treated  by PT/ OT and recommended for Home PT/ assist with all functional activities.

## 2019-01-06 NOTE — DISCHARGE NOTE ADULT - ADDITIONAL INSTRUCTIONS
Return to ER if develops fevers, bleeding , wound drainage, uncontrolled pain, weakness of extremities, lethargy or sluggishness..  [facility] Appt - [phone], Appointment on: Wednesday 1/23/19 @ 10:30AM with Dr Paul Camacho

## 2019-01-06 NOTE — DISCHARGE NOTE ADULT - DURABLE MEDICAL EQUIPMENT AGENCY
Walker - Counts include 234 beds at the [facility]. Walker - Provided adan - left at bedside

## 2019-01-06 NOTE — PROGRESS NOTE ADULT - ASSESSMENT
63M s/p fall w/ R frontal contusion. He is significantly improved neurologically. Will d/c bolt in am.    q1 neuro checks  d/c bolt in am  CTH in am  continue CIWA and Na goals

## 2019-01-06 NOTE — DISCHARGE NOTE ADULT - CARE PROVIDER_API CALL
Nicholas Rios (DO), Neurological Surgery  [address]  [place] [address]  Phone: [phone]  Fax: [phone]

## 2019-01-07 PROCEDURE — 76700 US EXAM ABDOM COMPLETE: CPT | Mod: 26

## 2019-01-07 PROCEDURE — 99233 SBSQ HOSP IP/OBS HIGH 50: CPT

## 2019-01-07 PROCEDURE — 71045 X-RAY EXAM CHEST 1 VIEW: CPT | Mod: 26

## 2019-01-07 RX ORDER — NICOTINE POLACRILEX 2 MG
1 GUM BUCCAL DAILY
Qty: 0 | Refills: 0 | Status: DISCONTINUED | OUTPATIENT
Start: 2019-01-07 | End: 2019-01-08

## 2019-01-07 RX ORDER — NYSTATIN 500MM UNIT
500000 POWDER (EA) MISCELLANEOUS EVERY 12 HOURS
Qty: 0 | Refills: 0 | Status: COMPLETED | OUTPATIENT
Start: 2019-01-07 | End: 2019-01-12

## 2019-01-07 RX ORDER — DIPHENHYDRAMINE HCL 50 MG
25 CAPSULE ORAL EVERY 4 HOURS
Qty: 0 | Refills: 0 | Status: DISCONTINUED | OUTPATIENT
Start: 2019-01-07 | End: 2019-01-08

## 2019-01-07 RX ADMIN — Medication 500000 UNIT(S): at 17:14

## 2019-01-07 RX ADMIN — Medication 1 MILLIGRAM(S): at 12:42

## 2019-01-07 RX ADMIN — AMLODIPINE BESYLATE 10 MILLIGRAM(S): 2.5 TABLET ORAL at 05:11

## 2019-01-07 RX ADMIN — ENOXAPARIN SODIUM 40 MILLIGRAM(S): 100 INJECTION SUBCUTANEOUS at 17:08

## 2019-01-07 RX ADMIN — QUETIAPINE FUMARATE 12.5 MILLIGRAM(S): 200 TABLET, FILM COATED ORAL at 05:11

## 2019-01-07 RX ADMIN — OXYCODONE HYDROCHLORIDE 5 MILLIGRAM(S): 5 TABLET ORAL at 18:25

## 2019-01-07 RX ADMIN — QUETIAPINE FUMARATE 12.5 MILLIGRAM(S): 200 TABLET, FILM COATED ORAL at 12:43

## 2019-01-07 RX ADMIN — LACOSAMIDE 100 MILLIGRAM(S): 50 TABLET ORAL at 17:14

## 2019-01-07 RX ADMIN — Medication 1 PATCH: at 20:02

## 2019-01-07 RX ADMIN — Medication 100 MILLIGRAM(S): at 12:42

## 2019-01-07 RX ADMIN — Medication 40 MILLIEQUIVALENT(S): at 02:16

## 2019-01-07 RX ADMIN — OXYCODONE HYDROCHLORIDE 5 MILLIGRAM(S): 5 TABLET ORAL at 18:55

## 2019-01-07 RX ADMIN — Medication 8 MILLIGRAM(S): at 21:17

## 2019-01-07 RX ADMIN — LACOSAMIDE 100 MILLIGRAM(S): 50 TABLET ORAL at 05:11

## 2019-01-07 RX ADMIN — Medication 1 TABLET(S): at 12:42

## 2019-01-07 NOTE — OCCUPATIONAL THERAPY INITIAL EVALUATION ADULT - DIAGNOSIS, OT EVAL
Pt demonstrated decreased strength, balance, and cognition impacting pt's ability to participate in functional mobility and ADLs.

## 2019-01-07 NOTE — PHYSICAL THERAPY INITIAL EVALUATION ADULT - CRITERIA FOR SKILLED THERAPEUTIC INTERVENTIONS
anticipated discharge recommendation/rehab potential/predicted duration of therapy intervention/impairments found/anticipated equipment needs at discharge/risk reduction/prevention/therapy frequency/functional limitations in following categories

## 2019-01-07 NOTE — PHYSICAL THERAPY INITIAL EVALUATION ADULT - GENERAL OBSERVATIONS, REHAB EVAL
Pt received supine in bed, +tele, +pulseox, +IV, +a-line, +b/l wrist restraints, agreeable to session, radha 30 min.

## 2019-01-07 NOTE — PROGRESS NOTE ADULT - SUBJECTIVE AND OBJECTIVE BOX
Vital Signs Last 24 Hrs  T(C): 37.4 (06 Jan 2019 23:00), Max: 37.7 (06 Jan 2019 03:00)  T(F): 99.4 (06 Jan 2019 23:00), Max: 99.9 (06 Jan 2019 03:00)  HR: 89 (06 Jan 2019 23:00) (61 - 90)  BP: 147/79 (06 Jan 2019 07:00) (143/83 - 164/94)  BP(mean): 97 (06 Jan 2019 07:00) (97 - 115)  RR: 14 (06 Jan 2019 23:00) (10 - 23)  SpO2: 99% (06 Jan 2019 23:00) (96% - 100%)    EXAMINATION:  alert, oriented to self and month/year, intermittently to place  t BUE/BLE full strength

## 2019-01-07 NOTE — OCCUPATIONAL THERAPY INITIAL EVALUATION ADULT - ASR WT BEARING STATUS EVAL
CTH: S/p removal of a L frontalintracranial bolt monitor, with continued unchanged evolving B frontotemporal parietal hemorrhagic contusions, subdural and SAHs with surrounding edema and mass effect with more pronounced focal area of decreased attenuation along the L posterior internal capsule and thalamus concerning for developing ischemia. CT Chest: No acute intrathoracic or intra-abdominal traumatic injury on this noncontrast examination.Question early cirrhotic morphology of the liver. Right renal hypodense focus is indeterminant but likely represents a slightly complex cyst. Nonemergent renal ultrasound may be obtained for further evaluation./no weight-bearing restrictions

## 2019-01-07 NOTE — SWALLOW BEDSIDE ASSESSMENT ADULT - ORAL PHASE
Within functional limits mildly prolonged yet fuctional Delayed oral transit time/prolonged/inefficient

## 2019-01-07 NOTE — PHYSICAL THERAPY INITIAL EVALUATION ADULT - ADDITIONAL COMMENTS
1/04/19 CT HEAD: Bilateral frontal and bilateral temporal lobe intraparenchymal hemorrhages, diffuse subarachnoid hemorrhage with intraventricular extension with 3 mm leftward shift, there are bilateral subdural hemorrhages with lateral extension along the tentorial leaflets and falx measuring up to 6 mm in greatest thickness on the left and 4 mm in greatest thickness on the right. There is a focal area of increased attenuation concerning for hemorrhage with surrounding edema in the midbrain, axial  image 12. There is decreased attenuation surrounding the intraparenchymal hemorrhages likely  edema with additional areas of nonspecific decreased aeration white matter, underlying additional areas of ischemia not excluded, there is effacement of the sulci near the vertex.

## 2019-01-07 NOTE — PHYSICAL THERAPY INITIAL EVALUATION ADULT - MANUAL MUSCLE TESTING RESULTS, REHAB EVAL
BUE/BLE grossly at least 3/5 throughout; not formally assessed as pt inconsistently following commands, fixated on finding something to drink (pt NPO) and glasses/grossly assessed due to

## 2019-01-07 NOTE — SWALLOW BEDSIDE ASSESSMENT ADULT - SWALLOW EVAL: DIAGNOSIS
Pt presents with a grossly functional oropharyngeal swallow. Mildly prolonged/inefficient bolus prep of hard solids due to partial dentition. No overt signs of laryngeal penetration/aspiration on all consistencies trialed. Of note, patient with white coating on lingual surface. Would suggest MD evaluate to r/o thrush.

## 2019-01-07 NOTE — PHYSICAL THERAPY INITIAL EVALUATION ADULT - LEVEL OF INDEPENDENCE: SIT/SUPINE, REHAB EVAL
Pt's father was updated that provider is not comfortable filling med at this time. Father verbalizes understanding.    moderate assist (50% patients effort)

## 2019-01-07 NOTE — DIETITIAN INITIAL EVALUATION ADULT. - ENERGY NEEDS
Ht: 67"  Wt: 150  BMI: 23.6 kg/m2   IBW: 148 (+/-10%)     within IBW  Edema: none       Skin: no pressure injuries documented

## 2019-01-07 NOTE — PHYSICAL THERAPY INITIAL EVALUATION ADULT - PRECAUTIONS/LIMITATIONS, REHAB EVAL
Repeat CTH following admission showing R frontal hematoma expansion w/ increased edema, intubated for airway protection. s/p ICP bolt./fall precautions

## 2019-01-07 NOTE — SWALLOW BEDSIDE ASSESSMENT ADULT - SLP PERTINENT HISTORY OF CURRENT PROBLEM
63 year-old man with history of alcohol abuse was found wandering around attempting to enter a locked industrial facility on 1/4/18 and was taken to [facility] when he was noted to be confused. He was reported to be agitated, oriented only to self, no following commands but briskly moving all limbs. Imaging revealed multifocal contusions and subarachnoid hemorrhage. CTA revealed possible AVM so he was transferred to [facility] for further management. Of note, he received 8mg of lorazepam at OSH. Repeat CTH following admission showing R frontal hematoma expansion w/ increased edema, intubated for airway protection, evaluated by neurosurgery for hematoma evacuation--no OR at this time.

## 2019-01-07 NOTE — PROGRESS NOTE ADULT - SUBJECTIVE AND OBJECTIVE BOX
SUMMARY:   63 year-old man with history of alcohol abuse was found wandering around attempting to enter a locked industrial facility on 1/4/18 and was taken to [facility] when he was noted to be confused. He was reported to be agitated, oriented only to self, no following commands but briskly moving all limbs. Imaging revealed multifocal contusions and subarachnoid hemorrhage. 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 Of note, he received 8mg of lorazepam at OSH.     Repeat CTH following admission showing R frontal hematoma expansion w/ increased edema, intubated for airway protection, evaluated by neurosurgery for hematoma evacuation--no OR at this time    HOSPITAL COURSE:  1/6: extubated      Overnight Events:     ROS: negative [] unable to obtain as patient is comatose/intubated/aphasic []     VITALS:   T(C): 37.2 (01-07-19 @ 07:00), Max: 37.4 (01-06-19 @ 23:00)  HR: 81 (01-07-19 @ 07:00) (61 - 102)  BP: --  RR: 19 (01-07-19 @ 07:00) (10 - 22)  SpO2: 100% (01-07-19 @ 07:00) (96% - 100%)    01-06-19 @ 07:01  -  01-07-19 @ 07:00  --------------------------------------------------------  IN: 686.8 mL / OUT: 1200 mL / NET: -513.2 mL    01-07-19 @ 07:01  -  01-07-19 @ 07:59  --------------------------------------------------------  IN: 60 mL / OUT: 0 mL / NET: 60 mL      LABS:  ABG - ( 05 Jan 2019 11:22 )  pH, Arterial: 7.43  pH, Blood: x     /  pCO2: 35    /  pO2: 239   / HCO3: 23    / Base Excess: -.6   /  SaO2: 100     01-06    135  |  100  |  10  ----------------------------<  154<H>  3.3<L>   |  20<L>  |  0.56    Ca    8.5      06 Jan 2019 18:54  Phos  3.1     01-06  Mg     2.0     01-06    TPro  6.7  /  Alb  3.5  /  TBili  1.6<H>  /  DBili  x   /  AST  67<H>  /  ALT  38  /  AlkPhos  49  01-05      MEDS:  MEDICATIONS  (STANDING):  amLODIPine   Tablet 10 milliGRAM(s) Oral daily  chlorhexidine 4% Liquid 1 Application(s) Topical <User Schedule>  doxazosin 8 milliGRAM(s) Oral at bedtime  enoxaparin Injectable 40 milliGRAM(s) SubCutaneous <User Schedule>  folic acid 1 milliGRAM(s) Oral daily  influenza   Vaccine 0.5 milliLiter(s) IntraMuscular once  lacosamide 100 milliGRAM(s) Oral two times a day  multivitamin 1 Tablet(s) Oral daily  QUEtiapine 12.5 milliGRAM(s) Oral two times a day  thiamine 100 milliGRAM(s) Oral daily      EXAMINATION:  alert, oriented to self and month/year but not place  marginally compliant with exam, unable to asses for drift, but BUE/BLE full strength  rrr no m/r/g  clear lungs  soft abd  wwp ext SUMMARY:   63 year-old man with history of alcohol abuse was found wandering around attempting to enter a locked industrial facility on 1/4/18 and was taken to [facility] when he was noted to be confused. He was reported to be agitated, oriented only to self, no following commands but briskly moving all limbs. Imaging revealed multifocal contusions and subarachnoid hemorrhage. 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 Of note, he received 8mg of lorazepam at OSH.     Repeat CTH following admission showing R frontal hematoma expansion w/ increased edema, intubated for airway protection, evaluated by neurosurgery for hematoma evacuation--no OR at this time    HOSPITAL COURSE:  1/6: extubated    Overnight Events: At times mildly agitated, but A&O x4. Requiring restraints. Pulled out NG tube.     ROS: Negative unless specified.     VITALS:   T(C): 37.2 (01-07-19 @ 07:00), Max: 37.4 (01-06-19 @ 23:00)  HR: 81 (01-07-19 @ 07:00) (61 - 102)  BP: --  RR: 19 (01-07-19 @ 07:00) (10 - 22)  SpO2: 100% (01-07-19 @ 07:00) (96% - 100%)    01-06-19 @ 07:01  -  01-07-19 @ 07:00  --------------------------------------------------------  IN: 686.8 mL / OUT: 1200 mL / NET: -513.2 mL    01-07-19 @ 07:01  -  01-07-19 @ 07:59  --------------------------------------------------------  IN: 60 mL / OUT: 0 mL / NET: 60 mL      LABS:  ABG - ( 05 Jan 2019 11:22 )  pH, Arterial: 7.43  pH, Blood: x     /  pCO2: 35    /  pO2: 239   / HCO3: 23    / Base Excess: -.6   /  SaO2: 100     01-06    135  |  100  |  10  ----------------------------<  154<H>  3.3<L>   |  20<L>  |  0.56    Ca    8.5      06 Jan 2019 18:54  Phos  3.1     01-06  Mg     2.0     01-06    TPro  6.7  /  Alb  3.5  /  TBili  1.6<H>  /  DBili  x   /  AST  67<H>  /  ALT  38  /  AlkPhos  49  01-05      MEDS:  MEDICATIONS  (STANDING):  amLODIPine   Tablet 10 milliGRAM(s) Oral daily  chlorhexidine 4% Liquid 1 Application(s) Topical <User Schedule>  doxazosin 8 milliGRAM(s) Oral at bedtime  enoxaparin Injectable 40 milliGRAM(s) SubCutaneous <User Schedule>  folic acid 1 milliGRAM(s) Oral daily  influenza   Vaccine 0.5 milliLiter(s) IntraMuscular once  lacosamide 100 milliGRAM(s) Oral two times a day  multivitamin 1 Tablet(s) Oral daily  QUEtiapine 12.5 milliGRAM(s) Oral two times a day  thiamine 100 milliGRAM(s) Oral daily      EXAMINATION:  Alert, oriented x 3.   Pupils equal and reactive to light. EOMI.  LUE drift. LUE 4/5, RUE 5/5.  B/l LE antigravity; unable to strength test 2/2 to poor concentration.   RRR.   No respiratory distress.   Soft abd  No peripheral edema  Skin: Warm/Dry

## 2019-01-07 NOTE — DIETITIAN INITIAL EVALUATION ADULT. - PERTINENT MEDS FT
MEDICATIONS  (STANDING):  amLODIPine   Tablet 10 milliGRAM(s) Oral daily  chlorhexidine 4% Liquid 1 Application(s) Topical <User Schedule>  doxazosin 8 milliGRAM(s) Oral at bedtime  enoxaparin Injectable 40 milliGRAM(s) SubCutaneous <User Schedule>  folic acid 1 milliGRAM(s) Oral daily  influenza   Vaccine 0.5 milliLiter(s) IntraMuscular once  lacosamide 100 milliGRAM(s) Oral two times a day  multivitamin 1 Tablet(s) Oral daily  QUEtiapine 12.5 milliGRAM(s) Oral two times a day  thiamine 100 milliGRAM(s) Oral daily    MEDICATIONS  (PRN):  oxyCODONE    IR 5 milliGRAM(s) Oral every 6 hours PRN Moderate Pain (4 - 6)  oxyCODONE    IR 10 milliGRAM(s) Oral every 6 hours PRN Severe Pain (7 - 10)  med

## 2019-01-07 NOTE — PHYSICAL THERAPY INITIAL EVALUATION ADULT - PLANNED THERAPY INTERVENTIONS, PT EVAL
transfer training/balance training/gait training/bed mobility training/stair training; GOAL: Pt will negotiate up & down 5 stairs independently with unilateral HR & least restrictive AD, in 4 weeks.

## 2019-01-07 NOTE — DIETITIAN INITIAL EVALUATION ADULT. - OTHER INFO
Pt seen for: NSCU Length Of Stay   Adm dx: SAH, SDH, IPH. H/O ETOH abuse   GI issues: no N/V   Last BM: none since adm    Food allergies/Intolerances: NKFA    Vit/supplement PTA: none noted

## 2019-01-07 NOTE — OCCUPATIONAL THERAPY INITIAL EVALUATION ADULT - PERTINENT HX OF CURRENT PROBLEM, REHAB EVAL
63 year-old man with history of alcohol abuse was found wandering around attempting to enter a locked industrial facility on 1/4/18 and was taken to [facility] when he was noted to be confused. He was reported to be agitated, oriented only to self, no following commands but briskly moving all limbs. Imaging revealed multifocal contusions and subarachnoid hemorrhage. 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

## 2019-01-07 NOTE — OCCUPATIONAL THERAPY INITIAL EVALUATION ADULT - ADDITIONAL COMMENTS
CT C Spine: Bilateral frontal and bilateral temporal lobe intraparenchymal hemorrhages, diffuse subarachnoid hemorrhage with intraventricular extension with 3 mm leftward shift, there are bilateral subdural hemorrhages with lateral extension along the tentorial leaflets and falx measuring up to 6 mm in greatest thickness on the left and 4 mm in greatest thickness on the right. There is a focal area of increased attenuation concerning for hemorrhage with surrounding edema in the midbrain, axial  image 12. Xray chest (-)

## 2019-01-07 NOTE — SWALLOW BEDSIDE ASSESSMENT ADULT - COMMENTS
CT head 1/6: Status post removal of a left frontal intracranial bolt monitor, with continued unchanged evolving bilateral frontotemporal parietal   hemorrhagic contusions, subdural and subarachnoid hemorrhages with surrounding edema and mass effect with more pronounced focal area of   decreased attenuation along the left posterior internal capsule and thalamus concerning for developing ischemia. CT head 1/6: Status post removal of a left frontal intracranial bolt monitor, with continued unchanged evolving bilateral frontotemporal parietal   hemorrhagic contusions, subdural and subarachnoid hemorrhages with surrounding edema and mass effect with more pronounced focal area of   decreased attenuation along the left posterior internal capsule and thalamus concerning for developing ischemia. CXR 1/5: clear lungs CT head 1/6: Status post removal of a left frontal intracranial bolt monitor, with continued unchanged evolving bilateral frontotemporal parietal   hemorrhagic contusions, subdural and subarachnoid hemorrhages with surrounding edema and mass effect with more pronounced focal area of   decreased attenuation along the left posterior internal capsule and thalamus concerning for developing ischemia. CXR 1/5: clear lungs. UA+. Airway removal 1/5. ICP [place] removal 1/6. CIWA for ETOH withdrawal.

## 2019-01-07 NOTE — PHYSICAL THERAPY INITIAL EVALUATION ADULT - BALANCE TRAINING, PT EVAL
GOAL: Pt will demonstrate improved static/dynamic standing balance by 1/2 balance grade, in order to improve stability, decrease fall risk and increase independence with transfers and ambulation within 4 weeks.

## 2019-01-07 NOTE — OCCUPATIONAL THERAPY INITIAL EVALUATION ADULT - PLANNED THERAPY INTERVENTIONS, OT EVAL
strengthening/bed mobility training/ADL retraining/balance training/fine motor coordination training/cognitive, visual perceptual/transfer training

## 2019-01-07 NOTE — DIETITIAN INITIAL EVALUATION ADULT. - NUTRITION INTERVENTION
Meals and Snack/Enteral Nutrition Medical Food Supplements/Vitamin Enteral Nutrition/Meals and Snack/Medical Food Supplements

## 2019-01-07 NOTE — PHYSICAL THERAPY INITIAL EVALUATION ADULT - LIVES WITH, PROFILE
For information on Fall & Injury Prevention, visit [email or web address]/Pt reports he lives alone in basement with 7 stairs to entrance, however pt's daughter reporting pt with 3-4 stairs to entrance.  Prior to admission pt independent with all functional mobility & working.

## 2019-01-07 NOTE — DIETITIAN INITIAL EVALUATION ADULT. - NS AS NUTRI INTERV ENTERAL NUTRITION
If pt continues on EN, recommend Jevity 1.2 at 85cc/hr x 18 hrs provides 1836 kcals, 85 gm protein, 1235 cc free water  meets 27 Kcal/Kg, 1.2Gm/kg dosing wt 68kg

## 2019-01-07 NOTE — PROGRESS NOTE ADULT - ASSESSMENT
ASSESSMENT/PLAN: Contusions (traumatic like pattern) and subarachnoid hemorrhage r/o vascular malformation; encephalopathy  IPH expansion on CTH; CTA reviewed, less likely AVM, no IR plan at this time    NEURO:   jett d/c'ed this AM, post-pull CT stable, evolving heme/mass effect  vimpat for seizure ppx x7d,   monitor for EtOH withdrawal - CIWA     PULM: sat > 92%    CV: -160    RENAL:  IVL; goal Na 135-145    GI:  failed dysphagia, pending speech/swallow, NGT in, start jevity    ID: continues to spike fevers, + UA, + urinary retention -- start ceftriaxone, awaiting cultures    ENDO: maintain euglycemia    HEME:  monitor H/H, SQL    Patient was critically ill with high risk of neurologic deterioration and/or death due to intracranial hemorrhage.    45 minutes spent. ASSESSMENT/PLAN: Contusions (traumatic like pattern) and subarachnoid hemorrhage r/o vascular malformation; encephalopathy  IPH expansion on CTH; CTA reviewed, less likely AVM, no IR plan at this time    NEURO:   Neurocheck q4H  Vimpat for seizure ppx x7d,   Monitor for EtOH withdrawal - CIWA; on thiamine and folic acid  On Seroquel 12.5 BID to help with agitation    PULM: sat > 92%  Incentive spirometry if able    CV: -160  Continue on amlodipine 10 daily    RENAL:  IVL; goal Na 135-145  On Doxazosin 8 qHS for urinary retention    GI:  On Jevity  GI ppx: not indicated  Bowel regimen    ID: Afebrile for last 24 hours  Was started on Ceftriaxone for dirty UA, which was d/c'ed since urine culture was negative  Monitor off abx    ENDO: Maintain euglycemia    HEME:    SCDs, DVT ppx w/ Lovenox 40 sc qHS    Full code  Will consult Dr. Junior  Dispo: Possible floor tomorrow under Dr. Junior (NSGY)    30  minutes spent. ASSESSMENT/PLAN: Contusions (traumatic like pattern) and subarachnoid hemorrhage r/o vascular malformation; encephalopathy  IPH expansion on CTH; CTA reviewed, less likely AVM, no IR plan at this time    NEURO:   Neurocheck q4H  Vimpat for seizure ppx x7d,   Monitor for EtOH withdrawal - CIWA; on thiamine and folic acid  On Seroquel 12.5 BID to help with agitation    PULM: sat > 92%  Incentive spirometry if able    CV: -160  Continue on amlodipine 10 daily    RENAL:  IVL; goal Na 135-145  On Doxazosin 8 qHS for urinary retention    GI:  On Jevity  GI ppx: not indicated  Bowel regimen    ID: Afebrile for last 24 hours  Was started on Ceftriaxone for dirty UA, which was d/c'ed since urine culture was negative  Monitor off abx    ENDO: Maintain euglycemia    HEME:    SCDs, DVT ppx w/ Lovenox 40 sc qHS    Full code    Dispo: Possible floor tomorrow

## 2019-01-07 NOTE — PROGRESS NOTE ADULT - ASSESSMENT
ASSESSMENT/PLAN: Contusions (traumatic like pattern) and subarachnoid hemorrhage r/o vascular malformation; encephalopathy  IPH expansion on CTH; CTA reviewed, less likely AVM, no IR plan at this time    vimpat for seizure ppx x7d  monitor for EtOH withdrawal  tube feeding

## 2019-01-07 NOTE — SWALLOW BEDSIDE ASSESSMENT ADULT - SLP GENERAL OBSERVATIONS
Pt is found seated upright; +NGT; b/l wrist restraints for safety; verbal; AA+Ox3 Pt is found seated upright; +NGT; b/l wrist restraints for safety; verbal; AA+Ox3; no evidence of dysphonia; denies pain

## 2019-01-07 NOTE — PHYSICAL THERAPY INITIAL EVALUATION ADULT - PERTINENT HX OF CURRENT PROBLEM, REHAB EVAL
63 y.o. man with hx of alcohol abuse was found wandering around attempting to enter a locked industrial facility on 1/4/18 and was taken to [facility] when he was noted to be confused, agitated, oriented only to self, not following commands but briskly moving all limbs. Imaging revealed multifocal contusions and subarachnoid hemorrhage. 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

## 2019-01-08 DIAGNOSIS — I51.9 HEART DISEASE, UNSPECIFIED: ICD-10-CM

## 2019-01-08 DIAGNOSIS — T79.6XXA TRAUMATIC ISCHEMIA OF MUSCLE, INITIAL ENCOUNTER: ICD-10-CM

## 2019-01-08 DIAGNOSIS — F05 DELIRIUM DUE TO KNOWN PHYSIOLOGICAL CONDITION: ICD-10-CM

## 2019-01-08 DIAGNOSIS — F10.10 ALCOHOL ABUSE, UNCOMPLICATED: ICD-10-CM

## 2019-01-08 DIAGNOSIS — G93.40 ENCEPHALOPATHY, UNSPECIFIED: ICD-10-CM

## 2019-01-08 DIAGNOSIS — R17 UNSPECIFIED JAUNDICE: ICD-10-CM

## 2019-01-08 DIAGNOSIS — N28.9 DISORDER OF KIDNEY AND URETER, UNSPECIFIED: ICD-10-CM

## 2019-01-08 DIAGNOSIS — I60.9 NONTRAUMATIC SUBARACHNOID HEMORRHAGE, UNSPECIFIED: ICD-10-CM

## 2019-01-08 LAB
ALBUMIN SERPL ELPH-MCNC: 4.1 G/DL — SIGNIFICANT CHANGE UP (ref 3.3–5)
ALP SERPL-CCNC: 57 U/L — SIGNIFICANT CHANGE UP (ref 40–120)
ALT FLD-CCNC: 90 U/L — HIGH (ref 10–45)
ANION GAP SERPL CALC-SCNC: 13 MMOL/L — SIGNIFICANT CHANGE UP (ref 5–17)
ANION GAP SERPL CALC-SCNC: 14 MMOL/L — SIGNIFICANT CHANGE UP (ref 5–17)
ANION GAP SERPL CALC-SCNC: 14 MMOL/L — SIGNIFICANT CHANGE UP (ref 5–17)
AST SERPL-CCNC: 93 U/L — HIGH (ref 10–40)
BILIRUB DIRECT SERPL-MCNC: 0.3 MG/DL — HIGH (ref 0–0.2)
BILIRUB INDIRECT FLD-MCNC: 1 MG/DL — SIGNIFICANT CHANGE UP (ref 0.2–1)
BILIRUB SERPL-MCNC: 1.3 MG/DL — HIGH (ref 0.2–1.2)
BUN SERPL-MCNC: 10 MG/DL — SIGNIFICANT CHANGE UP (ref 7–23)
BUN SERPL-MCNC: 10 MG/DL — SIGNIFICANT CHANGE UP (ref 7–23)
BUN SERPL-MCNC: 9 MG/DL — SIGNIFICANT CHANGE UP (ref 7–23)
CALCIUM SERPL-MCNC: 9.2 MG/DL — SIGNIFICANT CHANGE UP (ref 8.4–10.5)
CALCIUM SERPL-MCNC: 9.2 MG/DL — SIGNIFICANT CHANGE UP (ref 8.4–10.5)
CALCIUM SERPL-MCNC: 9.4 MG/DL — SIGNIFICANT CHANGE UP (ref 8.4–10.5)
CHLORIDE SERPL-SCNC: 88 MMOL/L — LOW (ref 96–108)
CHLORIDE SERPL-SCNC: 90 MMOL/L — LOW (ref 96–108)
CHLORIDE SERPL-SCNC: 94 MMOL/L — LOW (ref 96–108)
CO2 SERPL-SCNC: 23 MMOL/L — SIGNIFICANT CHANGE UP (ref 22–31)
CO2 SERPL-SCNC: 24 MMOL/L — SIGNIFICANT CHANGE UP (ref 22–31)
CO2 SERPL-SCNC: 24 MMOL/L — SIGNIFICANT CHANGE UP (ref 22–31)
CREAT SERPL-MCNC: 0.53 MG/DL — SIGNIFICANT CHANGE UP (ref 0.5–1.3)
CREAT SERPL-MCNC: 0.54 MG/DL — SIGNIFICANT CHANGE UP (ref 0.5–1.3)
CREAT SERPL-MCNC: 0.63 MG/DL — SIGNIFICANT CHANGE UP (ref 0.5–1.3)
GLUCOSE SERPL-MCNC: 130 MG/DL — HIGH (ref 70–99)
GLUCOSE SERPL-MCNC: 137 MG/DL — HIGH (ref 70–99)
GLUCOSE SERPL-MCNC: 142 MG/DL — HIGH (ref 70–99)
MAGNESIUM SERPL-MCNC: 1.9 MG/DL — SIGNIFICANT CHANGE UP (ref 1.6–2.6)
PHOSPHATE SERPL-MCNC: 3.7 MG/DL — SIGNIFICANT CHANGE UP (ref 2.5–4.5)
POTASSIUM SERPL-MCNC: 3.8 MMOL/L — SIGNIFICANT CHANGE UP (ref 3.5–5.3)
POTASSIUM SERPL-MCNC: 3.8 MMOL/L — SIGNIFICANT CHANGE UP (ref 3.5–5.3)
POTASSIUM SERPL-MCNC: 4 MMOL/L — SIGNIFICANT CHANGE UP (ref 3.5–5.3)
POTASSIUM SERPL-SCNC: 3.8 MMOL/L — SIGNIFICANT CHANGE UP (ref 3.5–5.3)
POTASSIUM SERPL-SCNC: 3.8 MMOL/L — SIGNIFICANT CHANGE UP (ref 3.5–5.3)
POTASSIUM SERPL-SCNC: 4 MMOL/L — SIGNIFICANT CHANGE UP (ref 3.5–5.3)
PROT SERPL-MCNC: 7.6 G/DL — SIGNIFICANT CHANGE UP (ref 6–8.3)
SODIUM SERPL-SCNC: 126 MMOL/L — LOW (ref 135–145)
SODIUM SERPL-SCNC: 127 MMOL/L — LOW (ref 135–145)
SODIUM SERPL-SCNC: 131 MMOL/L — LOW (ref 135–145)

## 2019-01-08 PROCEDURE — 93010 ELECTROCARDIOGRAM REPORT: CPT

## 2019-01-08 PROCEDURE — 99223 1ST HOSP IP/OBS HIGH 75: CPT

## 2019-01-08 PROCEDURE — 99233 SBSQ HOSP IP/OBS HIGH 50: CPT

## 2019-01-08 PROCEDURE — 99254 IP/OBS CNSLTJ NEW/EST MOD 60: CPT | Mod: GC

## 2019-01-08 PROCEDURE — 93010 ELECTROCARDIOGRAM REPORT: CPT | Mod: 77

## 2019-01-08 RX ORDER — SODIUM CHLORIDE 5 G/100ML
1000 INJECTION, SOLUTION INTRAVENOUS
Qty: 0 | Refills: 0 | Status: DISCONTINUED | OUTPATIENT
Start: 2019-01-08 | End: 2019-01-11

## 2019-01-08 RX ORDER — VALPROIC ACID (AS SODIUM SALT) 250 MG/5ML
250 SOLUTION, ORAL ORAL
Qty: 0 | Refills: 0 | Status: DISCONTINUED | OUTPATIENT
Start: 2019-01-08 | End: 2019-01-08

## 2019-01-08 RX ORDER — SODIUM CHLORIDE 9 MG/ML
1000 INJECTION, SOLUTION INTRAVENOUS
Qty: 0 | Refills: 0 | Status: DISCONTINUED | OUTPATIENT
Start: 2019-01-08 | End: 2019-01-09

## 2019-01-08 RX ORDER — ACETAMINOPHEN 500 MG
650 TABLET ORAL EVERY 6 HOURS
Qty: 0 | Refills: 0 | Status: DISCONTINUED | OUTPATIENT
Start: 2019-01-08 | End: 2019-01-16

## 2019-01-08 RX ORDER — SENNA PLUS 8.6 MG/1
2 TABLET ORAL AT BEDTIME
Qty: 0 | Refills: 0 | Status: DISCONTINUED | OUTPATIENT
Start: 2019-01-08 | End: 2019-01-16

## 2019-01-08 RX ORDER — DIPHENHYDRAMINE HCL 50 MG
50 CAPSULE ORAL EVERY 4 HOURS
Qty: 0 | Refills: 0 | Status: DISCONTINUED | OUTPATIENT
Start: 2019-01-08 | End: 2019-01-08

## 2019-01-08 RX ORDER — DOCUSATE SODIUM 100 MG
100 CAPSULE ORAL THREE TIMES A DAY
Qty: 0 | Refills: 0 | Status: DISCONTINUED | OUTPATIENT
Start: 2019-01-08 | End: 2019-01-16

## 2019-01-08 RX ORDER — HYDRALAZINE HCL 50 MG
5 TABLET ORAL ONCE
Qty: 0 | Refills: 0 | Status: COMPLETED | OUTPATIENT
Start: 2019-01-08 | End: 2019-01-08

## 2019-01-08 RX ORDER — HALOPERIDOL DECANOATE 100 MG/ML
0.5 INJECTION INTRAMUSCULAR EVERY 6 HOURS
Qty: 0 | Refills: 0 | Status: DISCONTINUED | OUTPATIENT
Start: 2019-01-08 | End: 2019-01-11

## 2019-01-08 RX ORDER — DIVALPROEX SODIUM 500 MG/1
250 TABLET, DELAYED RELEASE ORAL
Qty: 0 | Refills: 0 | Status: DISCONTINUED | OUTPATIENT
Start: 2019-01-08 | End: 2019-01-11

## 2019-01-08 RX ORDER — THIAMINE MONONITRATE (VIT B1) 100 MG
500 TABLET ORAL THREE TIMES A DAY
Qty: 0 | Refills: 0 | Status: COMPLETED | OUTPATIENT
Start: 2019-01-08 | End: 2019-01-10

## 2019-01-08 RX ORDER — NICOTINE POLACRILEX 2 MG
1 GUM BUCCAL DAILY
Qty: 0 | Refills: 0 | Status: DISCONTINUED | OUTPATIENT
Start: 2019-01-08 | End: 2019-01-16

## 2019-01-08 RX ADMIN — Medication 8 MILLIGRAM(S): at 23:03

## 2019-01-08 RX ADMIN — Medication 650 MILLIGRAM(S): at 23:02

## 2019-01-08 RX ADMIN — Medication 5 MILLIGRAM(S): at 18:10

## 2019-01-08 RX ADMIN — Medication 100 MILLIGRAM(S): at 15:41

## 2019-01-08 RX ADMIN — Medication 100 MILLIGRAM(S): at 23:03

## 2019-01-08 RX ADMIN — SENNA PLUS 2 TABLET(S): 8.6 TABLET ORAL at 23:03

## 2019-01-08 RX ADMIN — Medication 105 MILLIGRAM(S): at 14:00

## 2019-01-08 RX ADMIN — Medication 1 MILLIGRAM(S): at 11:01

## 2019-01-08 RX ADMIN — SODIUM CHLORIDE 100 MILLILITER(S): 9 INJECTION, SOLUTION INTRAVENOUS at 10:49

## 2019-01-08 RX ADMIN — QUETIAPINE FUMARATE 12.5 MILLIGRAM(S): 200 TABLET, FILM COATED ORAL at 04:43

## 2019-01-08 RX ADMIN — Medication 500000 UNIT(S): at 04:50

## 2019-01-08 RX ADMIN — DIVALPROEX SODIUM 250 MILLIGRAM(S): 500 TABLET, DELAYED RELEASE ORAL at 18:06

## 2019-01-08 RX ADMIN — LACOSAMIDE 100 MILLIGRAM(S): 50 TABLET ORAL at 18:06

## 2019-01-08 RX ADMIN — Medication 1 TABLET(S): at 11:01

## 2019-01-08 RX ADMIN — Medication 50 MILLIGRAM(S): at 11:01

## 2019-01-08 RX ADMIN — LACOSAMIDE 100 MILLIGRAM(S): 50 TABLET ORAL at 04:43

## 2019-01-08 RX ADMIN — Medication 1 PATCH: at 07:30

## 2019-01-08 RX ADMIN — Medication 1 PATCH: at 11:01

## 2019-01-08 RX ADMIN — Medication 500000 UNIT(S): at 18:07

## 2019-01-08 RX ADMIN — Medication 105 MILLIGRAM(S): at 23:06

## 2019-01-08 RX ADMIN — ENOXAPARIN SODIUM 40 MILLIGRAM(S): 100 INJECTION SUBCUTANEOUS at 18:07

## 2019-01-08 RX ADMIN — AMLODIPINE BESYLATE 10 MILLIGRAM(S): 2.5 TABLET ORAL at 04:44

## 2019-01-08 RX ADMIN — QUETIAPINE FUMARATE 12.5 MILLIGRAM(S): 200 TABLET, FILM COATED ORAL at 18:34

## 2019-01-08 RX ADMIN — Medication 1 PATCH: at 11:30

## 2019-01-08 RX ADMIN — Medication 50 MILLIGRAM(S): at 02:59

## 2019-01-08 RX ADMIN — HALOPERIDOL DECANOATE 0.5 MILLIGRAM(S): 100 INJECTION INTRAMUSCULAR at 03:39

## 2019-01-08 NOTE — BEHAVIORAL HEALTH ASSESSMENT NOTE - NSBHCHARTREVIEWLAB_PSY_A_CORE FT
01-06    135  |  100  |  10  ----------------------------<  154<H>  3.3<L>   |  20<L>  |  0.56    Ca    8.5      06 Jan 2019 18:54  Phos  3.1     01-06  Mg     2.0     01-06

## 2019-01-08 NOTE — BEHAVIORAL HEALTH ASSESSMENT NOTE - DESCRIPTION (FIRST USE, LAST USE, QUANTITY, FREQUENCY, DURATION)
patient smokes 1PPD Patient has daily heavy alcohol use; will drink 1/2bottle of vodka/day and more on weekends

## 2019-01-08 NOTE — BEHAVIORAL HEALTH ASSESSMENT NOTE - SUMMARY
Patient is a 62yo M, employed as , domiciled in private apartment,  with 2 adult daughters, with no significant PMH and PPH of alcohol use disorder, who presented s/p fall with subarachnoid hemorrhage. Psychiatry was consulted for management of agitation. CIWA 0-2 since last night.   Recommendations:   - Start standing Depakon 250mg IV BID   - Continue Seroquel 12.5mg BID   - Can give depakon 250mg Q6H PRN for agitation  - Recommend discontinuing Benadryl PRN Patient is a 62yo M, employed as , domiciled in private apartment,  with 2 adult daughters, with no significant PMH and PPH of alcohol use disorder, who presented s/p fall with subarachnoid hemorrhage. Psychiatry was consulted for management of agitation. CIWA 0-2 since last night.   Recommendations:   - Start standing Depacon 250mg IV BID   - Continue Seroquel 12.5mg BID   - Can give Depacon 250mg Q6H PRN for agitation  - Recommend discontinuing Benadryl PRN

## 2019-01-08 NOTE — BEHAVIORAL HEALTH ASSESSMENT NOTE - NSBHCHARTREVIEWVS_PSY_A_CORE FT
Vital Signs Last 24 Hrs  T(C): 37.7 (08 Jan 2019 09:00), Max: 37.7 (08 Jan 2019 05:24)  T(F): 99.8 (08 Jan 2019 09:00), Max: 99.9 (08 Jan 2019 05:24)  HR: 99 (08 Jan 2019 09:00) (79 - 106)  BP: 167/73 (08 Jan 2019 09:00) (129/65 - 167/73)  BP(mean): 100 (07 Jan 2019 19:00) (100 - 108)  RR: 18 (08 Jan 2019 09:00) (14 - 25)  SpO2: 97% (08 Jan 2019 09:00) (95% - 100%)

## 2019-01-08 NOTE — BEHAVIORAL HEALTH ASSESSMENT NOTE - HPI (INCLUDE ILLNESS QUALITY, SEVERITY, DURATION, TIMING, CONTEXT, MODIFYING FACTORS, ASSOCIATED SIGNS AND SYMPTOMS)
Patient is a 64yo M, employed as , domiciled in private apartment,  with 2 adult daughters, with no significant PMH and PPH of alcohol use disorder, who presented s/p fall with subarachnoid hemorrhage. Psychiatry was consulted for management of agitation. Patient received 0.5mg IM Haldol PRN at roughly 3am, in addition to 50mg IM Benadryl, for agitation.     Upon interview this AM, patient is lethargic but arousable to verbal stimuli. He is AAOx3 and able to say he is at [facility] and that the year is 2019. Patient complains of headache, chest pain, and back pain. He is calm and cooperative, not agitated and not trying to climb out of bed.     Collateral obtained from patient's daughter, Sandra [phone]:   Per daughter, patient does not have any significant PMH or psychiatric history. He is a habitual drinker and does drink 1/2bottle of vodka/day, with more on the weekends. He also smokes 1PPD. Patient is a ; his wife passed away 9 years ago. He now has a girlfriend but lives alone in private apartment. Daughter says that following her mother's passing, patient lived with his daughters for 7 years but then grew tired of them criticizing his drinking and smoking, and wanted more independence; thus, he began living alone 2 years ago. Per daughter, patient had been in his usual state of health until day before admission, when he went missing from his apartment and left his cell phone behind. Patient's daughters waited 24 hours and then called 911 and filed a missing persons report. Eventually, patient was found somewhere in [place], wandering, and was brought to [facility] ED. Daughter is still unclear what patient was doing wandering far from home, but patient told her that he remembers falling. Per daughter, since his hospital admission, patient seems to be "sharp" and at his baseline of mental acuity; he is able to tell her the date and his memory is intact. He is able to recall the full name of his late wife. Daughter is only concerned that patient may be a flight risk; yesterday, he was asking son-in-law if he could go downstairs to smoke cigarettes. It is daughter's belief that patient does not like to be confined in hospital room and is used to having his freedom; he may try to leave the hospital in order to smoke/drink.

## 2019-01-08 NOTE — BEHAVIORAL HEALTH ASSESSMENT NOTE - RISK ASSESSMENT
Patient is at an increased risk of harm due to an underlying condition, delirium, and history of recent agitation. However, he is not acutely psychotic, manic, or agitated. Recommend continuing medical workup. Patient does not meet criteria for inpatient psychiatric hospitalization at this time.

## 2019-01-08 NOTE — PROGRESS NOTE ADULT - ASSESSMENT
63 year-old man with history of alcohol abuse was found wandering around attempting to enter a locked industrial facility on 1/4/18 and was taken to [facility] when he was noted to be confused. He was reported to be agitated, oriented only to self, no following commands but briskly moving all limbs. Imaging revealed multifocal contusions and subarachnoid hemorrhage. CTA revealed possible AVM so he was transferred to [facility] for further management. Of note, he received 8mg of lorazepam at OSH. (04 Jan 2019 17:42)    PROCEDURE: 1/4 s/p ICP (intracranial pressure) monitoring      PLAN:  -Neuro: Oxy IR for pain control   -[facility] called for agitation and they recommend: 1)Recommend starting Depakon 250mg BID 2) Can continue Seroquel 12.5mg BID   -Encouraged incentive spirometry   -Alcohol abuse: restarted CIWA   -Increased nicotine patch as patient has chronic history of being a smoker  -Norvasc for HTN  -Colace senna for bowel regimen  -DVT ppx: venodynes and sql  -PT/OT acute TBI when stable     Will discuss above with Dr. Gabriel Ivory #[id number] 63 year-old man with history of alcohol abuse was found wandering around attempting to enter a locked industrial facility on 1/4/18 and was taken to [facility] when he was noted to be confused. He was reported to be agitated, oriented only to self, no following commands but briskly moving all limbs. Imaging revealed multifocal contusions and subarachnoid hemorrhage. 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 Of note, he received 8mg of lorazepam at OSH. (04 Jan 2019 17:42)    PROCEDURE: 1/4 s/p ICP (intracranial pressure) monitoring      PLAN:  -Neuro: Oxy IR for pain control   -[facility] called for agitation and they recommend: 1)Recommend starting Depakon 250mg BID 2) Can continue Seroquel 12.5mg BID   -Encouraged incentive spirometry   -Alcohol abuse: restarted CIWA   -Increased nicotine patch as patient has chronic history of being a smoker  -Norvasc for HTN  -Colace senna for bowel regimen  -DVT ppx: venodynes and sql  -PT/OT acute TBI when stable     Will discuss above with Dr. Gabriel Ivory #[id number]     More than 30 minutes were spent educating the patient and family regarding condition, medications, follow up plans, signs and symptoms to be concerned with, preparing paperwork, and questions answered regarding discharge.

## 2019-01-08 NOTE — BEHAVIORAL HEALTH ASSESSMENT NOTE - NSBHCHARTREVIEWIMAGING_PSY_A_CORE FT
< from: CT Head No Cont (01.06.19 @ 08:48) >    IMPRESSION:    Status post removal of a left frontal intracranial bolt monitor, with   continued unchanged evolving bilateral frontotemporal parietal   hemorrhagic contusions, subdural and subarachnoid hemorrhages with   surrounding edema and mass effect with more pronounced focal area of   decreased attenuation along the left posterior internal capsule and   thalamus concerning for developing ischemia.

## 2019-01-08 NOTE — CONSULT NOTE ADULT - PROBLEM SELECTOR RECOMMENDATION 6
biliary evaluation neg   ?hemolysis, gilberts   check fractionated bili, ldh, hapto.  platelets coags acceptable.

## 2019-01-08 NOTE — PROGRESS NOTE ADULT - SUBJECTIVE AND OBJECTIVE BOX
SUBJECTIVE: Patient seen and examined at bedside.     OVERNIGHT EVENTS: tx from [facility].      Vital Signs Last 24 Hrs  T(C): 37.7 (08 Jan 2019 12:25), Max: 37.7 (08 Jan 2019 05:24)  T(F): 99.8 (08 Jan 2019 12:25), Max: 99.9 (08 Jan 2019 05:24)  HR: 93 (08 Jan 2019 12:25) (81 - 106)  BP: 160/79 (08 Jan 2019 12:25) (129/65 - 167/73)  BP(mean): 100 (07 Jan 2019 19:00) (100 - 108)  RR: 18 (08 Jan 2019 12:25) (14 - 24)  SpO2: 95% (08 Jan 2019 12:25) (95% - 100%)    PHYSICAL EXAM:    General: No Acute Distress     Neurological: Alert, oriented to self and month/year, intermittently to place, Following simple Commands, LUE drift. LUE 4/5, RUE 5/5    Pulmonary: Clear to Auscultation, No Rales, No Rhonchi, No Wheezes     Cardiovascular: S1, S2, Regular Rate and Rhythm     Gastrointestinal: Soft, Nontender, Nondistended     Incision: + staples, c/d/i     LABS: no new labs     DIET: soft diet     IMAGING: no new imaging

## 2019-01-08 NOTE — BEHAVIORAL HEALTH ASSESSMENT NOTE - NSBHCONSULTMEDS_PSY_A_CORE FT
- Recommend starting Depakon 250mg BID   - Can continue Seroquel 12.5mg BID - Recommend starting Depacon 250mg BID (monitor LRTs, lipase and amylase)  - Can continue Seroquel 12.5mg BID

## 2019-01-08 NOTE — CONSULT NOTE ADULT - ASSESSMENT
63M w/alcohol abuse has traumatic SDH SAH brain contusion s/p ICP bolt, intubated, now w/encephalopathy

## 2019-01-08 NOTE — CONSULT NOTE ADULT - SUBJECTIVE AND OBJECTIVE BOX
Authored by Dr Uriel Moreno [phone]   After hours or if no response please page [phone]    PMD: none    Patient is a 63y old  Male who presents with a chief complaint of AMS, IPH (07 Jan 2019 07:58)      HPI:  63 year-old man with history of alcohol abuse was found wandering around attempting to enter a locked industrial facility on 1/4/18 and was taken to [facility] when he was noted to be confused. He was reported to be agitated, oriented only to self, no following commands but briskly moving all limbs. Imaging revealed multifocal contusions and subarachnoid hemorrhage. CTA revealed possible AVM so he was transferred to [facility] for further management. Of note, he received 8mg of lorazepam at OSH. (04 Jan 2019 17:42) ICP monitor placed. Noted to have expanding hematoma, intubated. Hyponatremia managed w/hypertonic saline. Febrile in NSCU, treated for UTI. NGT was placed. 1/6 became agitated off precedex, pulled out NGT. CIWA scores last night btw 0-2, as high as 9 on 1/6. Pt never received phenobarb. Initially had elevated CK c/w rhabdo - large blood on ua but also + rbcs. Cr wnl.       History limited due to: [ ] Dementia  [ ] Delirium  [ ] Condition    Pain Symptoms if applicable:             	                         none	   mild         moderate         severe  Pain:	                            0	    1-3	     4-6	         7-10  Location:	  Modifying factors:	  Associated symptoms:	    Function: [ ] Independent  [ ] Assistance  [ ] Total care  [ ] Non-ambulatory    Allergies    No Known Allergies    Intolerances        HOME MEDICATIONS: [ ] Reviewed   Home Medications:      MEDICATIONS  (STANDING):  amLODIPine   Tablet 10 milliGRAM(s) Oral daily  doxazosin 8 milliGRAM(s) Oral at bedtime  enoxaparin Injectable 40 milliGRAM(s) SubCutaneous <User Schedule>  folic acid 1 milliGRAM(s) Oral daily  influenza   Vaccine 0.5 milliLiter(s) IntraMuscular once  lacosamide 100 milliGRAM(s) Oral two times a day  multivitamin 1 Tablet(s) Oral daily  nicotine - 21 mG/24Hr(s) Patch 1 patch Transdermal daily  nystatin    Suspension 172291 Unit(s) Oral every 12 hours  QUEtiapine 12.5 milliGRAM(s) Oral two times a day  thiamine 100 milliGRAM(s) Oral daily    MEDICATIONS  (PRN):  diphenhydrAMINE 50 milliGRAM(s) Oral every 4 hours PRN Assaultive behavior  haloperidol    Injectable 0.5 milliGRAM(s) IntraMuscular every 6 hours PRN Agitation  oxyCODONE    IR 5 milliGRAM(s) Oral every 6 hours PRN Moderate Pain (4 - 6)  oxyCODONE    IR 10 milliGRAM(s) Oral every 6 hours PRN Severe Pain (7 - 10)      PAST MEDICAL & SURGICAL HISTORY:  [ ] Reviewed     SOCIAL HISTORY:  Residence: [ ] [facility]  [ ] SNF  [ ] Community  [ ] Substance abuse:   [ ] Tobacco:   [ ] Alcohol use:     FAMILY HISTORY:  [ ] No pertinent family history in first degree relatives     REVIEW OF SYSTEMS:    CONSTITUTIONAL: No fever, weight loss, or fatigue  EYES: No eye pain, visual disturbances, or discharge  ENMT:  No difficulty hearing, tinnitus, vertigo; No sinus or throat pain  NECK: No pain or stiffness  BREASTS: No pain, masses, or nipple discharge  RESPIRATORY: No cough, wheezing, chills or hemoptysis; No shortness of breath  CARDIOVASCULAR: No chest pain, palpitations, dizziness, or leg swelling  GASTROINTESTINAL: No abdominal or epigastric pain. No nausea, vomiting, or hematemesis; No diarrhea or constipation. No melena or hematochezia.  GENITOURINARY: No dysuria, frequency, hematuria, or incontinence  NEUROLOGICAL: No headaches, memory loss, loss of strength, numbness, or tremors  SKIN: No itching, burning, rashes, or lesions   LYMPH NODES: No enlarged glands  ENDOCRINE: No heat or cold intolerance; No hair loss  MUSCULOSKELETAL: No muscle or back pain  PSYCHIATRIC: No depression, anxiety, mood swings, or difficulty sleeping  HEME/LYMPH: No easy bruising, or bleeding gums  ALLERGY AND IMMUNOLOGIC: No hives or eczema    [  ] All other ROS negative  [  ] Unable to obtain due to poor mental status    Vital Signs Last 24 Hrs  T(C): 37.7 (08 Jan 2019 05:24), Max: 37.7 (08 Jan 2019 05:24)  T(F): 99.9 (08 Jan 2019 05:24), Max: 99.9 (08 Jan 2019 05:24)  HR: 95 (08 Jan 2019 05:24) (79 - 107)  BP: 129/65 (08 Jan 2019 05:24) (129/65 - 155/86)  BP(mean): 100 (07 Jan 2019 19:00) (100 - 108)  RR: 18 (08 Jan 2019 05:24) (13 - 25)  SpO2: 95% (08 Jan 2019 05:24) (95% - 100%)    PHYSICAL EXAM:    GENERAL: NAD, well-groomed, well-developed  HEAD:  Atraumatic, Normocephalic  EYES: EOMI, PERRLA, conjunctiva and sclera clear  ENMT: Moist mucous membranes  NECK: Supple, No JVD  RESPIRATORY: Clear to auscultation bilaterally; No rales, rhonchi, wheezing, or rubs  CARDIOVASCULAR: Regular rate and rhythm; No murmurs, rubs, or gallops  GASTROINTESTINAL: Soft, Nontender, Nondistended; Bowel sounds present  GENITOURINARY: Not examined  EXTREMITIES:  2+ Peripheral Pulses, No clubbing, cyanosis, or edema  NEURO:  Alert & Oriented X3; Moving all 4 extremities; No gross sensory deficits  HEME/LYMPH: No lymphadenopathy noted  SKIN: No rashes or lesions; Incisions C/D/I    LABS:    01-06    135  |  100  |  10  ----------------------------<  154<H>  3.3<L>   |  20<L>  |  0.56    Ca    8.5      06 Jan 2019 18:54  Phos  3.1     01-06  Mg     2.0     01-06          CAPILLARY BLOOD GLUCOSE            RADIOLOGY & ADDITIONAL STUDIES:    EKG:   Personally Reviewed:  [ ] YES     Imaging:   Personally Reviewed:  [ ] YES               Consultant(s) notes reviewed:    Care Discussed with Consultant(s)/Other Providers:    Advanced Directives: [ ] DNR  [ ] No feeding tube  [ ] MOLST in chart  [ ] MOLST completed today  [ ] Unknown    [ ] Probable osteoporosis  [ ] Possible osteomalacia  [ ] Increased delirium risk  [ ] Delirium and other risks can be reduced by:          -early ambulation          -minimizing "tethers" - IV, oxygen, catheters, etc          -avoiding hypnotics and sedatives          -maintaining hydration/nutrition          -avoid anticholinergics - diphenhydramine, etc          -pain control          -supportive environment Authored by Dr Uriel Moreno [phone]   After hours or if no response please page [phone]    PMD: none    Patient is a 63y old  Male who presents with a chief complaint of AMS, IPH (07 Jan 2019 07:58)      HPI:  63 year-old man with history of alcohol abuse was found wandering around attempting to enter a locked industrial facility on 1/4/18 and was taken to [facility] when he was noted to be confused. He was reported to be agitated, oriented only to self, no following commands but briskly moving all limbs. Imaging revealed multifocal contusions and subarachnoid hemorrhage. CTA revealed possible AVM so he was transferred to [facility] for further management. Of note, he received 8mg of lorazepam at OSH. (04 Jan 2019 17:42) ICP monitor placed. Noted to have expanding hematoma, intubated. Hyponatremia managed w/hypertonic saline. Febrile in NSCU, treated for UTI. NGT was placed. 1/6 became agitated off precedex, pulled out NGT. CIWA scores last night btw 0-2, as high as 9 on 1/6. Pt never received phenobarb. Initially had elevated CK c/w rhabdo - large blood on ua but also + rbcs. Cr wnl. TTE notable for segmental LV dysfunction      History limited due to: [ ] Dementia  [x ] Delirium  [ x] Condition    Pain Symptoms if applicable:             	                         none	   mild         moderate         severe  Pain:	                            0	    1-3	     4-6	         7-10  Location:	  Modifying factors:	  Associated symptoms:	    Function: [ ] Independent  [ ] Assistance  [ ] Total care  [ ] Non-ambulatory    Allergies    No Known Allergies    Intolerances        HOME MEDICATIONS: [ ] Reviewed   Home Medications:      MEDICATIONS  (STANDING):  amLODIPine   Tablet 10 milliGRAM(s) Oral daily  doxazosin 8 milliGRAM(s) Oral at bedtime  enoxaparin Injectable 40 milliGRAM(s) SubCutaneous <User Schedule>  folic acid 1 milliGRAM(s) Oral daily  influenza   Vaccine 0.5 milliLiter(s) IntraMuscular once  lacosamide 100 milliGRAM(s) Oral two times a day  multivitamin 1 Tablet(s) Oral daily  nicotine - 21 mG/24Hr(s) Patch 1 patch Transdermal daily  nystatin    Suspension 511257 Unit(s) Oral every 12 hours  QUEtiapine 12.5 milliGRAM(s) Oral two times a day  thiamine 100 milliGRAM(s) Oral daily    MEDICATIONS  (PRN):  diphenhydrAMINE 50 milliGRAM(s) Oral every 4 hours PRN Assaultive behavior  haloperidol    Injectable 0.5 milliGRAM(s) IntraMuscular every 6 hours PRN Agitation  oxyCODONE    IR 5 milliGRAM(s) Oral every 6 hours PRN Moderate Pain (4 - 6)  oxyCODONE    IR 10 milliGRAM(s) Oral every 6 hours PRN Severe Pain (7 - 10)      PAST MEDICAL & SURGICAL HISTORY:  Alcohol abuse  [x ] Reviewed     SOCIAL HISTORY:  Residence: [ ] [facility]  [ ] SNF  [x ] Community  [ ] Substance abuse:   [ ] Tobacco:   [ ] Alcohol use: +    FAMILY HISTORY:  [ ] No pertinent family history in first degree relatives     REVIEW OF SYSTEMS:    CONSTITUTIONAL: No fever, weight loss, or fatigue  EYES: No eye pain, visual disturbances, or discharge  ENMT:  No difficulty hearing, tinnitus, vertigo; No sinus or throat pain  NECK: No pain or stiffness  BREASTS: No pain, masses, or nipple discharge  RESPIRATORY: No cough, wheezing, chills or hemoptysis; No shortness of breath  CARDIOVASCULAR: No chest pain, palpitations, dizziness, or leg swelling  GASTROINTESTINAL: No abdominal or epigastric pain. No nausea, vomiting, or hematemesis; No diarrhea or constipation. No melena or hematochezia.  GENITOURINARY: No dysuria, frequency, hematuria, or incontinence  NEUROLOGICAL: No headaches, memory loss, loss of strength, numbness, or tremors  SKIN: No itching, burning, rashes, or lesions   LYMPH NODES: No enlarged glands  ENDOCRINE: No heat or cold intolerance; No hair loss  MUSCULOSKELETAL: No muscle or back pain  PSYCHIATRIC: No depression, anxiety, mood swings, or difficulty sleeping  HEME/LYMPH: No easy bruising, or bleeding gums  ALLERGY AND IMMUNOLOGIC: No hives or eczema    [  ] All other ROS negative  [  ] Unable to obtain due to poor mental status    Vital Signs Last 24 Hrs  T(C): 37.7 (08 Jan 2019 05:24), Max: 37.7 (08 Jan 2019 05:24)  T(F): 99.9 (08 Jan 2019 05:24), Max: 99.9 (08 Jan 2019 05:24)  HR: 95 (08 Jan 2019 05:24) (79 - 107)  BP: 129/65 (08 Jan 2019 05:24) (129/65 - 155/86)  BP(mean): 100 (07 Jan 2019 19:00) (100 - 108)  RR: 18 (08 Jan 2019 05:24) (13 - 25)  SpO2: 95% (08 Jan 2019 05:24) (95% - 100%)    PHYSICAL EXAM:    GENERAL: NAD, well-groomed, well-developed  HEAD:  Atraumatic, Normocephalic  EYES: EOMI, PERRLA, conjunctiva and sclera clear  ENMT: Moist mucous membranes  NECK: Supple, No JVD  RESPIRATORY: Clear to auscultation bilaterally; No rales, rhonchi, wheezing, or rubs  CARDIOVASCULAR: Regular rate and rhythm; No murmurs, rubs, or gallops  GASTROINTESTINAL: Soft, Nontender, Nondistended; Bowel sounds present  GENITOURINARY: Not examined  EXTREMITIES:  2+ Peripheral Pulses, No clubbing, cyanosis, or edema  NEURO:  Alert & Oriented X3; Moving all 4 extremities; No gross sensory deficits  HEME/LYMPH: No lymphadenopathy noted  SKIN: No rashes or lesions; Incisions C/D/I    LABS:    01-06    135  |  100  |  10  ----------------------------<  154<H>  3.3<L>   |  20<L>  |  0.56    Ca    8.5      06 Jan 2019 18:54  Phos  3.1     01-06  Mg     2.0     01-06          CAPILLARY BLOOD GLUCOSE            RADIOLOGY & ADDITIONAL STUDIES:    EKG:   Personally Reviewed:  [x ] YES - NSR small Q lead III QTc 435    Imaging:   Personally Reviewed:  [x ] YES < from: CT Head No Cont (01.06.19 @ 08:48) >    Status post removal of a left frontal intracranial bolt monitor, with   continued unchanged evolving bilateral frontotemporal parietal   hemorrhagic contusions, subdural and subarachnoid hemorrhages with   surrounding edema and mass effect with more pronounced focal area of   decreased attenuation along the left posterior internal capsule and   thalamus concerning for developing ischemia.    < end of copied text >  < from: Xray Chest 1 View- PORTABLE-Urgent (01.07.19 @ 10:00) >  The enteric tube tip terminates in the stomach.    Clear lungs.      < end of copied text >  < from: Transthoracic Echocardiogram (01.05.19 @ 17:40) >  1. Mild segmental left ventricular systolic dysfunction.  Overall preserved left ventricular ejection fraction. The  inferolateral wall appears  hypokinetic.  2. The right ventricle is not well visualized; grossly  normal right ventricular systolic function.  *** No previous Echo exam.    < end of copied text >                Consultant(s) notes reviewed:    Care Discussed with Consultant(s)/Other Providers: ROCIO Little re encephalopathy    Advanced Directives: [ ] DNR  [ ] No feeding tube  [ ] MOLST in chart  [ ] MOLST completed today  [ ] Unknown    [ ] Probable osteoporosis  [ ] Possible osteomalacia  [x ] Increased delirium risk  [x ] Delirium and other risks can be reduced by:          -early ambulation          -minimizing "tethers" - IV, oxygen, catheters, etc          -avoiding hypnotics and sedatives          -maintaining hydration/nutrition          -avoid anticholinergics - diphenhydramine, etc          -pain control          -supportive environment Authored by Dr Uriel Moreno [phone]   After hours or if no response please page [phone]    PMD: none    Patient is a 63y old  Male who presents with a chief complaint of AMS, IPH (07 Jan 2019 07:58)      HPI:  63 year-old man with history of alcohol abuse was found wandering around attempting to enter a locked industrial facility on 1/4/18 and was taken to [facility] when he was noted to be confused. He was reported to be agitated, oriented only to self, no following commands but briskly moving all limbs. Imaging revealed multifocal contusions and subarachnoid hemorrhage. CTA revealed possible AVM so he was transferred to [facility] for further management. Of note, he received 8mg of lorazepam at OSH. (04 Jan 2019 17:42) ICP monitor placed. Noted to have expanding hematoma, intubated. Hyponatremia managed w/hypertonic saline. Febrile in NSCU, treated for UTI. NGT was placed. 1/6 became agitated off precedex, pulled out NGT. CIWA scores last night btw 0-2, as high as 9 on 1/6. Pt never received phenobarb. Initially had elevated CK c/w rhabdo - large blood on ua but also + rbcs. Cr wnl. TTE notable for segmental LV dysfunction    Pt is unable to give me detailed accounting of his medical history. He denies alcohol/drug/tobacco abuse although I called his daughter Sandra [phone] and she informed me he drinks alcohol daily but "never to the point of abuse." She is not aware of any other injuries like this. She does not believe he has any medical history but she will try to find out more.     Pt c/o back pain. Limited following of commands. In posey restraint.       History limited due to: [ ] Dementia  [x ] Delirium  [ x] Condition    Pain Symptoms if applicable:  Pain:	moderate  Location:	back, generalized  Modifying factors: unclear	  Associated symptoms:	unclear    Function: [ x] Independent  [ ] Assistance  [ ] Total care  [ ] Non-ambulatory    Allergies    No Known Allergies    Intolerances        HOME MEDICATIONS: [x ] Reviewed   Home Medications: unknown, seems like none      MEDICATIONS  (STANDING):  amLODIPine   Tablet 10 milliGRAM(s) Oral daily  doxazosin 8 milliGRAM(s) Oral at bedtime  enoxaparin Injectable 40 milliGRAM(s) SubCutaneous <User Schedule>  folic acid 1 milliGRAM(s) Oral daily  influenza   Vaccine 0.5 milliLiter(s) IntraMuscular once  lacosamide 100 milliGRAM(s) Oral two times a day  multivitamin 1 Tablet(s) Oral daily  nicotine - 21 mG/24Hr(s) Patch 1 patch Transdermal daily  nystatin    Suspension [id number] Unit(s) Oral every 12 hours  QUEtiapine 12.5 milliGRAM(s) Oral two times a day  thiamine 100 milliGRAM(s) Oral daily    MEDICATIONS  (PRN):  diphenhydrAMINE 50 milliGRAM(s) Oral every 4 hours PRN Assaultive behavior  haloperidol    Injectable 0.5 milliGRAM(s) IntraMuscular every 6 hours PRN Agitation  oxyCODONE    IR 5 milliGRAM(s) Oral every 6 hours PRN Moderate Pain (4 - 6)  oxyCODONE    IR 10 milliGRAM(s) Oral every 6 hours PRN Severe Pain (7 - 10)      PAST MEDICAL & SURGICAL HISTORY:  Alcohol abuse  [x ] Reviewed     SOCIAL HISTORY:  Residence: [ ] [facility]  [ ] [facility]  [x ] Community  [ ] Substance abuse: unknown  [ ] Tobacco: unknown   [ ] Alcohol use: +    FAMILY HISTORY:  Unknown family hx    REVIEW OF SYSTEMS:      [  ] All other ROS negative  [x ] Unable to obtain due to poor mental status    Vital Signs Last 24 Hrs  T(C): 37.7 (08 Jan 2019 05:24), Max: 37.7 (08 Jan 2019 05:24)  T(F): 99.9 (08 Jan 2019 05:24), Max: 99.9 (08 Jan 2019 05:24)  HR: 95 (08 Jan 2019 05:24) (79 - 107)  BP: 129/65 (08 Jan 2019 05:24) (129/65 - 155/86)  BP(mean): 100 (07 Jan 2019 19:00) (100 - 108)  RR: 18 (08 Jan 2019 05:24) (13 - 25)  SpO2: 95% (08 Jan 2019 05:24) (95% - 100%)    PHYSICAL EXAM:    GENERAL: NAD, well-groomed, well-developed. posey restraint  HEAD:  L ICP bolt removed w/staples   EYES: EOMI, PERRLA, conjunctiva and sclera clear  ENMT: Moist mucous membranes  NECK: Supple, No JVD  RESPIRATORY: Clear to auscultation bilaterally; No rales, rhonchi, wheezing, or rubs  CARDIOVASCULAR: Regular rate and rhythm; No murmurs, rubs, or gallops  GASTROINTESTINAL: Soft, Nontender, Nondistended; Bowel sounds present  EXTREMITIES:  2+ Peripheral Pulses, No clubbing, cyanosis, or edema  NEURO:  Alert & Oriented X2-3; Moving all 4 extremities; No gross sensory deficits. not tremulous. mildly diaphoretic  SKIN: No rashes or lesions; Incisions C/D/I as above    LABS:    01-06    135  |  100  |  10  ----------------------------<  154<H>  3.3<L>   |  20<L>  |  0.56    Ca    8.5      06 Jan 2019 18:54  Phos  3.1     01-06  Mg     2.0     01-06    CAPILLARY BLOOD GLUCOSE      RADIOLOGY & ADDITIONAL STUDIES:    EKG:   Personally Reviewed:  [x ] YES - NSR small Q lead III QTc 435    Imaging:   Personally Reviewed:  [x ] YES < from: CT Head No Cont (01.06.19 @ 08:48) >    Status post removal of a left frontal intracranial bolt monitor, with   continued unchanged evolving bilateral frontotemporal parietal   hemorrhagic contusions, subdural and subarachnoid hemorrhages with   surrounding edema and mass effect with more pronounced focal area of   decreased attenuation along the left posterior internal capsule and   thalamus concerning for developing ischemia.    < end of copied text >  < from: Xray Chest 1 View- PORTABLE-Urgent (01.07.19 @ 10:00) >  The enteric tube tip terminates in the stomach.    Clear lungs.      < end of copied text >  < from: Transthoracic Echocardiogram (01.05.19 @ 17:40) >  1. Mild segmental left ventricular systolic dysfunction.  Overall preserved left ventricular ejection fraction. The  inferolateral wall appears  hypokinetic.  2. The right ventricle is not well visualized; grossly  normal right ventricular systolic function.  *** No previous Echo exam.    < end of copied text >                Consultant(s) notes reviewed:    Care Discussed with Consultant(s)/Other Providers: ROCIO Little re encephalopathy    Advanced Directives: [ ] DNR  [ ] No feeding tube  [ ] MOLST in chart  [ ] MOLST completed today  [ ] Unknown    [ ] Probable osteoporosis  [ ] Possible osteomalacia  [x ] Increased delirium risk  [x ] Delirium and other risks can be reduced by:          -early ambulation          -minimizing "tethers" - IV, oxygen, catheters, etc          -avoiding hypnotics and sedatives          -maintaining hydration/nutrition          -avoid anticholinergics - diphenhydramine, etc          -pain control          -supportive environment

## 2019-01-08 NOTE — BEHAVIORAL HEALTH ASSESSMENT NOTE - NSBHREFERDETAILS_PSY_A_CORE_FT
Patient with history of alcohol abuse with subarachnoid hemorrhage. Psychiatry consulted for agitation.

## 2019-01-08 NOTE — CONSULT NOTE ADULT - PROBLEM SELECTOR RECOMMENDATION 9
multifactorial incl head injury, alcohol withdrawal, developing thalamic CVA, critical care delirium  no cirrhosis identified on ab us, ammonia level is neg multifactorial incl head injury, alcohol withdrawal, developing thalamic CVA, critical care delirium  no cirrhosis identified on ab us, ammonia level is neg  ciwa scores had been low   would use Ativan as first line for agitation at this time  agree w/psych evaluation. QTc acceptable for antipsychotics  minimize opioids

## 2019-01-08 NOTE — CONSULT NOTE ADULT - PROBLEM SELECTOR RECOMMENDATION 5
pt contraindicated to antiplatelet at present  eventual cardiology outpatient f/u  would hold off on statin during rhabdo episode  consider metoprolol/ace inhibitor as BP allows  no evidence of HF

## 2019-01-08 NOTE — BEHAVIORAL HEALTH ASSESSMENT NOTE - NSBHCONSULTMEDAGITATION_PSY_A_CORE FT
- Recommend PRN Depakon 250mg q6H for agitation - Recommend PRN Depacon 250mg i.v. q6H for agitation

## 2019-01-08 NOTE — BEHAVIORAL HEALTH ASSESSMENT NOTE - CASE SUMMARY
Patient is a 62yo AM pt, employed as , domiciled in private apartment,  with 2 adult daughters, with no significant PMH and PPH of alcohol use disorder, who presented s/p fall with subarachnoid hemorrhage. Psychiatry was consulted for management of agitation. CIWA 0-2 since last night.    I have seen and evaluated this patient myself. Chart, labs, meds reviewed. I agree with resident's assessment and plan. This is a 63-y.o. AM pt, employed as , domiciled in private apartment,  with 2 adult daughters, with no significant PMH and PPH of alcohol use disorder, who presented s/p fall with subarachnoid hemorrhage. Psychiatry was consulted for management of agitation. CIWA 0-2 since last night.    I have seen and evaluated this patient myself. Chart, labs, meds reviewed. I agree with resident's assessment and plan.

## 2019-01-09 DIAGNOSIS — R50.82 POSTPROCEDURAL FEVER: ICD-10-CM

## 2019-01-09 DIAGNOSIS — E87.1 HYPO-OSMOLALITY AND HYPONATREMIA: ICD-10-CM

## 2019-01-09 LAB
ALBUMIN SERPL ELPH-MCNC: 3.5 G/DL — SIGNIFICANT CHANGE UP (ref 3.3–5)
ALP SERPL-CCNC: 53 U/L — SIGNIFICANT CHANGE UP (ref 40–120)
ALT FLD-CCNC: 108 U/L — HIGH (ref 10–45)
ANION GAP SERPL CALC-SCNC: 10 MMOL/L — SIGNIFICANT CHANGE UP (ref 5–17)
ANION GAP SERPL CALC-SCNC: 11 MMOL/L — SIGNIFICANT CHANGE UP (ref 5–17)
ANION GAP SERPL CALC-SCNC: 13 MMOL/L — SIGNIFICANT CHANGE UP (ref 5–17)
APPEARANCE UR: CLEAR — SIGNIFICANT CHANGE UP
AST SERPL-CCNC: 97 U/L — HIGH (ref 10–40)
BILIRUB DIRECT SERPL-MCNC: 0.2 MG/DL — SIGNIFICANT CHANGE UP (ref 0–0.2)
BILIRUB INDIRECT FLD-MCNC: 1 MG/DL — SIGNIFICANT CHANGE UP (ref 0.2–1)
BILIRUB SERPL-MCNC: 1.2 MG/DL — SIGNIFICANT CHANGE UP (ref 0.2–1.2)
BILIRUB UR-MCNC: NEGATIVE — SIGNIFICANT CHANGE UP
BUN SERPL-MCNC: 10 MG/DL — SIGNIFICANT CHANGE UP (ref 7–23)
BUN SERPL-MCNC: 11 MG/DL — SIGNIFICANT CHANGE UP (ref 7–23)
BUN SERPL-MCNC: 13 MG/DL — SIGNIFICANT CHANGE UP (ref 7–23)
CALCIUM SERPL-MCNC: 8.9 MG/DL — SIGNIFICANT CHANGE UP (ref 8.4–10.5)
CALCIUM SERPL-MCNC: 9 MG/DL — SIGNIFICANT CHANGE UP (ref 8.4–10.5)
CALCIUM SERPL-MCNC: 9 MG/DL — SIGNIFICANT CHANGE UP (ref 8.4–10.5)
CHLORIDE SERPL-SCNC: 96 MMOL/L — SIGNIFICANT CHANGE UP (ref 96–108)
CHLORIDE SERPL-SCNC: 98 MMOL/L — SIGNIFICANT CHANGE UP (ref 96–108)
CHLORIDE SERPL-SCNC: 98 MMOL/L — SIGNIFICANT CHANGE UP (ref 96–108)
CK SERPL-CCNC: 470 U/L — HIGH (ref 30–200)
CO2 SERPL-SCNC: 22 MMOL/L — SIGNIFICANT CHANGE UP (ref 22–31)
CO2 SERPL-SCNC: 24 MMOL/L — SIGNIFICANT CHANGE UP (ref 22–31)
CO2 SERPL-SCNC: 24 MMOL/L — SIGNIFICANT CHANGE UP (ref 22–31)
COLOR SPEC: YELLOW — SIGNIFICANT CHANGE UP
CREAT SERPL-MCNC: 0.65 MG/DL — SIGNIFICANT CHANGE UP (ref 0.5–1.3)
CREAT SERPL-MCNC: 0.69 MG/DL — SIGNIFICANT CHANGE UP (ref 0.5–1.3)
CREAT SERPL-MCNC: 0.72 MG/DL — SIGNIFICANT CHANGE UP (ref 0.5–1.3)
CULTURE RESULTS: SIGNIFICANT CHANGE UP
CULTURE RESULTS: SIGNIFICANT CHANGE UP
DIFF PNL FLD: NEGATIVE — SIGNIFICANT CHANGE UP
GLUCOSE SERPL-MCNC: 119 MG/DL — HIGH (ref 70–99)
GLUCOSE SERPL-MCNC: 120 MG/DL — HIGH (ref 70–99)
GLUCOSE SERPL-MCNC: 137 MG/DL — HIGH (ref 70–99)
GLUCOSE UR QL: ABNORMAL
HAPTOGLOB SERPL-MCNC: 316 MG/DL — HIGH (ref 34–200)
KETONES UR-MCNC: SIGNIFICANT CHANGE UP
LDH SERPL L TO P-CCNC: 279 U/L — HIGH (ref 50–242)
LEUKOCYTE ESTERASE UR-ACNC: NEGATIVE — SIGNIFICANT CHANGE UP
NITRITE UR-MCNC: NEGATIVE — SIGNIFICANT CHANGE UP
PH UR: 7 — SIGNIFICANT CHANGE UP (ref 5–8)
POTASSIUM SERPL-MCNC: 3.7 MMOL/L — SIGNIFICANT CHANGE UP (ref 3.5–5.3)
POTASSIUM SERPL-MCNC: 3.9 MMOL/L — SIGNIFICANT CHANGE UP (ref 3.5–5.3)
POTASSIUM SERPL-MCNC: 3.9 MMOL/L — SIGNIFICANT CHANGE UP (ref 3.5–5.3)
POTASSIUM SERPL-SCNC: 3.7 MMOL/L — SIGNIFICANT CHANGE UP (ref 3.5–5.3)
POTASSIUM SERPL-SCNC: 3.9 MMOL/L — SIGNIFICANT CHANGE UP (ref 3.5–5.3)
POTASSIUM SERPL-SCNC: 3.9 MMOL/L — SIGNIFICANT CHANGE UP (ref 3.5–5.3)
PROT SERPL-MCNC: 6.9 G/DL — SIGNIFICANT CHANGE UP (ref 6–8.3)
PROT UR-MCNC: SIGNIFICANT CHANGE UP
SODIUM SERPL-SCNC: 131 MMOL/L — LOW (ref 135–145)
SODIUM SERPL-SCNC: 132 MMOL/L — LOW (ref 135–145)
SODIUM SERPL-SCNC: 133 MMOL/L — LOW (ref 135–145)
SP GR SPEC: 1.02 — SIGNIFICANT CHANGE UP (ref 1.01–1.02)
SPECIMEN SOURCE: SIGNIFICANT CHANGE UP
SPECIMEN SOURCE: SIGNIFICANT CHANGE UP
UROBILINOGEN FLD QL: NEGATIVE — SIGNIFICANT CHANGE UP

## 2019-01-09 PROCEDURE — 99233 SBSQ HOSP IP/OBS HIGH 50: CPT

## 2019-01-09 PROCEDURE — 71045 X-RAY EXAM CHEST 1 VIEW: CPT | Mod: 26

## 2019-01-09 PROCEDURE — 99232 SBSQ HOSP IP/OBS MODERATE 35: CPT | Mod: GC

## 2019-01-09 RX ADMIN — Medication 1 PATCH: at 06:01

## 2019-01-09 RX ADMIN — Medication 8 MILLIGRAM(S): at 21:03

## 2019-01-09 RX ADMIN — LACOSAMIDE 100 MILLIGRAM(S): 50 TABLET ORAL at 18:23

## 2019-01-09 RX ADMIN — QUETIAPINE FUMARATE 12.5 MILLIGRAM(S): 200 TABLET, FILM COATED ORAL at 18:24

## 2019-01-09 RX ADMIN — Medication 105 MILLIGRAM(S): at 13:16

## 2019-01-09 RX ADMIN — Medication 1 PATCH: at 13:10

## 2019-01-09 RX ADMIN — Medication 1 MILLIGRAM(S): at 13:09

## 2019-01-09 RX ADMIN — DIVALPROEX SODIUM 250 MILLIGRAM(S): 500 TABLET, DELAYED RELEASE ORAL at 05:51

## 2019-01-09 RX ADMIN — Medication 105 MILLIGRAM(S): at 05:51

## 2019-01-09 RX ADMIN — Medication 100 MILLIGRAM(S): at 13:10

## 2019-01-09 RX ADMIN — LACOSAMIDE 100 MILLIGRAM(S): 50 TABLET ORAL at 05:51

## 2019-01-09 RX ADMIN — Medication 1 TABLET(S): at 13:10

## 2019-01-09 RX ADMIN — Medication 100 MILLIGRAM(S): at 05:51

## 2019-01-09 RX ADMIN — ENOXAPARIN SODIUM 40 MILLIGRAM(S): 100 INJECTION SUBCUTANEOUS at 18:23

## 2019-01-09 RX ADMIN — Medication 500000 UNIT(S): at 05:52

## 2019-01-09 RX ADMIN — AMLODIPINE BESYLATE 10 MILLIGRAM(S): 2.5 TABLET ORAL at 05:51

## 2019-01-09 RX ADMIN — QUETIAPINE FUMARATE 12.5 MILLIGRAM(S): 200 TABLET, FILM COATED ORAL at 05:51

## 2019-01-09 RX ADMIN — DIVALPROEX SODIUM 250 MILLIGRAM(S): 500 TABLET, DELAYED RELEASE ORAL at 18:23

## 2019-01-09 RX ADMIN — Medication 105 MILLIGRAM(S): at 21:03

## 2019-01-09 NOTE — PROGRESS NOTE BEHAVIORAL HEALTH - CASE SUMMARY
This is a 63-y.o. AM pt, employed as , domiciled in private apartment,  with 2 adult daughters, with no significant PMH and PPH of alcohol use disorder, who presented s/p fall with subarachnoid hemorrhage. Psychiatry was consulted for management of agitation. Solid response to valproate seen.    I have seen and evaluated this patient myself. Chart, labs, meds reviewed. I agree with resident's assessment and plan.

## 2019-01-09 NOTE — PROGRESS NOTE BEHAVIORAL HEALTH - NSBHFUPINTERVALHXFT_PSY_A_CORE
Overnight, patient had some restlessness, but improved from prior. He spiked a fever to 101.4F overnight and UA, reflex urine culture, and blood cultures were sent. Upon interview today, patient is somnolent but responsive, with his eyes closed, and says that he continues to have headache, chest pain, back pain, and now also bilateral wrist pain. He says he was not able to sleep last night due to pain. He is calm and cooperative. Per nursing, he has not had episodes of agitation this morning. He has been taken out of restraints. Staff looking into bruising on inside of patient's thighs and arms; patient's daughter was concerned that bruising may have been result of restraints.

## 2019-01-09 NOTE — PROGRESS NOTE BEHAVIORAL HEALTH - NSBHCHARTREVIEWLAB_PSY_A_CORE FT
01-09    133<L>  |  98  |  10  ----------------------------<  119<H>  3.9   |  24  |  0.65    Ca    9.0      09 Jan 2019 06:03  Phos  3.7     01-08  Mg     1.9     01-08    TPro  6.9  /  Alb  3.5  /  TBili  1.2  /  DBili  0.2  /  AST  97<H>  /  ALT  108<H>  /  AlkPhos  53  01-09

## 2019-01-09 NOTE — PROGRESS NOTE BEHAVIORAL HEALTH - SUMMARY
Patient is a 64yo M, employed as , domiciled in private apartment,  with 2 adult daughters, with no significant PMH and PPH of alcohol use disorder, who presented s/p fall with subarachnoid hemorrhage. Psychiatry was consulted for management of agitation. CIWA 0-2 since last night.   Recommendations:   - Continue standing Depacon 250mg IV BID   - Continue Seroquel 12.5mg BID   - Continue Depacon 250mg Q6H PRN for agitation

## 2019-01-09 NOTE — PROGRESS NOTE ADULT - SUBJECTIVE AND OBJECTIVE BOX
Authored by Dr Uriel Moreno [phone]  After hours or if no response please page Hospitalist service [phone]    Patient is a 63y old  Male who presents with a chief complaint of AMS, IPH (08 Jan 2019 13:16)    SUBJECTIVE / OVERNIGHT EVENTS: lethargic, c/o ha, chest and back pain. not very cooperative with interview or exam. CIWA = 0, up to 4 last night  noted yesterday w/acutely dropped hypoNa - started on 2% saline, rate decreased for overcorrection  febrile overnight - cultures sent  pt has mild cough noted, notes ?possible dysuria - difficult to ascertain given lethargy - but no pain at iv site    MEDICATIONS  (STANDING):  amLODIPine   Tablet 10 milliGRAM(s) Oral daily  diVALproex  milliGRAM(s) Oral two times a day  docusate sodium 100 milliGRAM(s) Oral three times a day  doxazosin 8 milliGRAM(s) Oral at bedtime  enoxaparin Injectable 40 milliGRAM(s) SubCutaneous <User Schedule>  folic acid 1 milliGRAM(s) Oral daily  influenza   Vaccine 0.5 milliLiter(s) IntraMuscular once  lacosamide 100 milliGRAM(s) Oral two times a day  multivitamin 1 Tablet(s) Oral daily  nicotine - 21 mG/24Hr(s) Patch 1 patch Transdermal daily  nystatin    Suspension 202567 Unit(s) Oral every 12 hours  QUEtiapine 12.5 milliGRAM(s) Oral two times a day  senna 2 Tablet(s) Oral at bedtime  sodium chloride 2% . 1000 milliLiter(s) (50 mL/Hr) IV Continuous <Continuous>  thiamine IVPB 500 milliGRAM(s) IV Intermittent three times a day    MEDICATIONS  (PRN):  acetaminophen   Tablet .. 650 milliGRAM(s) Oral every 6 hours PRN Temp greater or equal to 38C (100.4F), Mild Pain (1 - 3)  haloperidol    Injectable 0.5 milliGRAM(s) IntraMuscular every 6 hours PRN Agitation  LORazepam     Tablet 2 milliGRAM(s) Oral every 2 hours PRN CIWA-Ar score increase by 2 points and a total score of 7 or less  oxyCODONE    IR 5 milliGRAM(s) Oral every 6 hours PRN Moderate Pain (4 - 6)  oxyCODONE    IR 10 milliGRAM(s) Oral every 6 hours PRN Severe Pain (7 - 10)      Vital Signs Last 24 Hrs  T(C): 37.2 (09 Jan 2019 08:49), Max: 38.6 (08 Jan 2019 22:19)  T(F): 98.9 (09 Jan 2019 08:49), Max: 101.4 (08 Jan 2019 22:19)  HR: 80 (09 Jan 2019 08:49) (73 - 108)  BP: 124/71 (09 Jan 2019 08:49) (124/71 - 172/75)  BP(mean): --  RR: 18 (09 Jan 2019 08:49) (18 - 18)  SpO2: 94% (09 Jan 2019 08:49) (94% - 97%)  CAPILLARY BLOOD GLUCOSE        I&O's Summary    08 Jan 2019 07:01  -  09 Jan 2019 07:00  --------------------------------------------------------  IN: 2400 mL / OUT: 1300 mL / NET: 1100 mL        PHYSICAL EXAM  GENERAL: lethargic  HEAD:  L ICP bolt removed w/staples   EYES: EOMI, PERRLA, conjunctiva and sclera clear  ENMT: Moist mucous membranes  NECK: Supple, No JVD  RESPIRATORY: Clear to auscultation bilaterally; No rales, rhonchi, wheezing, or rubs  CARDIOVASCULAR: Regular rate and rhythm; No murmurs, rubs, or gallops  GASTROINTESTINAL: voluntary guarding no rebound tender in lower abdomen BS+  EXTREMITIES:  2+ Peripheral Pulses, No clubbing, cyanosis, or edema  NEURO:  Alert & Oriented X2 (person time); Moving all 4 extremities; No gross sensory deficits. mild resting tremor  SKIN: erythema of chest. no phlebitis at L arm IV site    LABS:    01-09    133<L>  |  98  |  10  ----------------------------<  119<H>  3.9   |  24  |  0.65    Ca    9.0      09 Jan 2019 06:03  Phos  3.7     01-08  Mg     1.9     01-08    TPro  6.9  /  Alb  3.5  /  TBili  1.2  /  DBili  0.2  /  AST  97<H>  /  ALT  108<H>  /  AlkPhos  53  01-09      CARDIAC MARKERS ( 09 Jan 2019 06:03 )  x     / x     / 470 U/L / x     / x          RADIOLOGY & ADDITIONAL TESTS:    Imaging Personally Reviewed: cxr clear  Consultant(s) Notes Reviewed:    Care Discussed with Consultants/Other Providers: ROCIO ARROYO Awijit re na and fever

## 2019-01-09 NOTE — PROGRESS NOTE ADULT - SUBJECTIVE AND OBJECTIVE BOX
SUBJECTIVE: Patient seen and examined at bedside. Denies any complaints at this time.     OVERNIGHT EVENTS: spiked a temp last night     Vital Signs Last 24 Hrs  T(C): 37.2 (09 Jan 2019 08:49), Max: 38.6 (08 Jan 2019 22:19)  T(F): 98.9 (09 Jan 2019 08:49), Max: 101.4 (08 Jan 2019 22:19)  HR: 80 (09 Jan 2019 08:49) (73 - 108)  BP: 124/71 (09 Jan 2019 08:49) (124/71 - 172/75)  BP(mean): --  RR: 18 (09 Jan 2019 08:49) (18 - 18)  SpO2: 94% (09 Jan 2019 08:49) (94% - 97%)    PHYSICAL EXAM:    General: No Acute Distress     Neurological: Alert, oriented to self and month/year, intermittently to place, Following simple Commands, LUE drift. LUE 4/5, RUE 5/5    Pulmonary: Clear to Auscultation, No Rales, No Rhonchi, No Wheezes     Cardiovascular: S1, S2, Regular Rate and Rhythm     Gastrointestinal: Soft, Nontender, Nondistended     Incision: + staples, c/d/i     LABS: 01-09    133<L>  |  98  |  10  ----------------------------<  119<H>  3.9   |  24  |  0.65    Ca    9.0      09 Jan 2019 06:03  Phos  3.7     01-08  Mg     1.9     01-08    TPro  6.9  /  Alb  3.5  /  TBili  1.2  /  DBili  0.2  /  AST  97<H>  /  ALT  108<H>  /  AlkPhos  53  01-09    DIET: soft diet     IMAGING: cxr pending

## 2019-01-09 NOTE — PROGRESS NOTE ADULT - ASSESSMENT
63M w/alcohol abuse has traumatic SDH SAH brain contusion s/p ICP bolt, intubated, now w/encephalopathy, post op fever, hyponatremia

## 2019-01-09 NOTE — PROGRESS NOTE BEHAVIORAL HEALTH - NSBHCHARTREVIEWVS_PSY_A_CORE FT
Vital Signs Last 24 Hrs  T(C): 37.2 (09 Jan 2019 08:49), Max: 38.6 (08 Jan 2019 22:19)  T(F): 98.9 (09 Jan 2019 08:49), Max: 101.4 (08 Jan 2019 22:19)  HR: 80 (09 Jan 2019 08:49) (73 - 108)  BP: 124/71 (09 Jan 2019 08:49) (124/71 - 172/75)  BP(mean): --  RR: 18 (09 Jan 2019 08:49) (18 - 18)  SpO2: 94% (09 Jan 2019 08:49) (94% - 97%)

## 2019-01-09 NOTE — PROGRESS NOTE ADULT - ASSESSMENT
63 year-old man with history of alcohol abuse was found wandering around attempting to enter a locked industrial facility on 1/4/18 and was taken to [facility] when he was noted to be confused. He was reported to be agitated, oriented only to self, no following commands but briskly moving all limbs. Imaging revealed multifocal contusions and subarachnoid hemorrhage. CTA revealed possible AVM so he was transferred to [facility] for further management. Of note, he received 8mg of lorazepam at OSH. (04 Jan 2019 17:42)    PROCEDURE: 1/4 s/p ICP (intracranial pressure) monitoring      PLAN:  -Neuro: Oxy IR for pain control   -Fever: patient pancultured; cultures pending  -Hyponatremia: started on hypertonic; bmp q 8 hours   -Ashley saw for agitation and they recommend: 1)Recommend starting Depakon 250mg BID 2) Can continue Seroquel 12.5mg BID   -Encouraged incentive spirometry   -Alcohol abuse: restarted CIWA   -Norvasc for HTN  -Colace senna for bowel regimen  -DVT ppx: venodynes and sql  -PT/OT acute TBI when stable     Will discuss above with Dr. Gabriel Ivory #[id number]     More than 30 minutes were spent educating the patient and family regarding condition, medications, follow up plans, signs and symptoms to be concerned with, preparing paperwork, and questions answered regarding discharge.     Assessment:  Please Check When Present   []  GCS  E   V  M     Heart Failure: []Acute, [] acute on chronic , []chronic  Heart Failure:  [] Diastolic (HFpEF), [] Systolic (HFrEF), []Combined (HFpEF and HFrEF), [] RHF, [] Pulm HTN, [] Other    [] SAMUEL, [] ATN, [] AIN, [] other  [] CKD1, [] CKD2, [] CKD 3, [] CKD 4, [] CKD 5, []ESRD    Encephalopathy: [] Metabolic, [] Hepatic, [] toxic, [x] Neurological, [] Other    Abnormal Nurtitional Status: [] malnurtition (see nutrition note), [ ]underweight: BMI < 19, [] morbid obesity: BMI >40, [] Cachexia    [] Sepsis  [] hypovolemic shock,[] cardiogenic shock, [] hemorrhagic shock, [] neuogenic shock  [] Acute Respiratory Failure  []Cerebral edema, [] Brain compression/ herniation,   [] Functional quadriplegia  [] Acute blood loss anemia

## 2019-01-10 LAB
ANION GAP SERPL CALC-SCNC: 10 MMOL/L — SIGNIFICANT CHANGE UP (ref 5–17)
ANION GAP SERPL CALC-SCNC: 12 MMOL/L — SIGNIFICANT CHANGE UP (ref 5–17)
ANION GAP SERPL CALC-SCNC: 14 MMOL/L — SIGNIFICANT CHANGE UP (ref 5–17)
ANION GAP SERPL CALC-SCNC: 15 MMOL/L — SIGNIFICANT CHANGE UP (ref 5–17)
BASOPHILS # BLD AUTO: 0.03 K/UL — SIGNIFICANT CHANGE UP (ref 0–0.2)
BASOPHILS NFR BLD AUTO: 0.4 % — SIGNIFICANT CHANGE UP (ref 0–2)
BUN SERPL-MCNC: 12 MG/DL — SIGNIFICANT CHANGE UP (ref 7–23)
BUN SERPL-MCNC: 13 MG/DL — SIGNIFICANT CHANGE UP (ref 7–23)
BUN SERPL-MCNC: 14 MG/DL — SIGNIFICANT CHANGE UP (ref 7–23)
BUN SERPL-MCNC: 14 MG/DL — SIGNIFICANT CHANGE UP (ref 7–23)
CALCIUM SERPL-MCNC: 8.7 MG/DL — SIGNIFICANT CHANGE UP (ref 8.4–10.5)
CALCIUM SERPL-MCNC: 9 MG/DL — SIGNIFICANT CHANGE UP (ref 8.4–10.5)
CALCIUM SERPL-MCNC: 9.2 MG/DL — SIGNIFICANT CHANGE UP (ref 8.4–10.5)
CALCIUM SERPL-MCNC: 9.2 MG/DL — SIGNIFICANT CHANGE UP (ref 8.4–10.5)
CHLORIDE SERPL-SCNC: 101 MMOL/L — SIGNIFICANT CHANGE UP (ref 96–108)
CHLORIDE SERPL-SCNC: 94 MMOL/L — LOW (ref 96–108)
CHLORIDE SERPL-SCNC: 97 MMOL/L — SIGNIFICANT CHANGE UP (ref 96–108)
CHLORIDE SERPL-SCNC: 99 MMOL/L — SIGNIFICANT CHANGE UP (ref 96–108)
CO2 SERPL-SCNC: 22 MMOL/L — SIGNIFICANT CHANGE UP (ref 22–31)
CO2 SERPL-SCNC: 23 MMOL/L — SIGNIFICANT CHANGE UP (ref 22–31)
CO2 SERPL-SCNC: 23 MMOL/L — SIGNIFICANT CHANGE UP (ref 22–31)
CO2 SERPL-SCNC: 25 MMOL/L — SIGNIFICANT CHANGE UP (ref 22–31)
CREAT SERPL-MCNC: 0.72 MG/DL — SIGNIFICANT CHANGE UP (ref 0.5–1.3)
CREAT SERPL-MCNC: 0.74 MG/DL — SIGNIFICANT CHANGE UP (ref 0.5–1.3)
CREAT SERPL-MCNC: 0.74 MG/DL — SIGNIFICANT CHANGE UP (ref 0.5–1.3)
CREAT SERPL-MCNC: 0.88 MG/DL — SIGNIFICANT CHANGE UP (ref 0.5–1.3)
EOSINOPHIL # BLD AUTO: 0.12 K/UL — SIGNIFICANT CHANGE UP (ref 0–0.5)
EOSINOPHIL NFR BLD AUTO: 1.5 % — SIGNIFICANT CHANGE UP (ref 0–6)
GLUCOSE SERPL-MCNC: 105 MG/DL — HIGH (ref 70–99)
GLUCOSE SERPL-MCNC: 135 MG/DL — HIGH (ref 70–99)
GLUCOSE SERPL-MCNC: 146 MG/DL — HIGH (ref 70–99)
GLUCOSE SERPL-MCNC: 89 MG/DL — SIGNIFICANT CHANGE UP (ref 70–99)
HCT VFR BLD CALC: 38.9 % — LOW (ref 39–50)
HGB BLD-MCNC: 13.3 G/DL — SIGNIFICANT CHANGE UP (ref 13–17)
IMM GRANULOCYTES NFR BLD AUTO: 0.3 % — SIGNIFICANT CHANGE UP (ref 0–1.5)
LYMPHOCYTES # BLD AUTO: 1.7 K/UL — SIGNIFICANT CHANGE UP (ref 1–3.3)
LYMPHOCYTES # BLD AUTO: 21.5 % — SIGNIFICANT CHANGE UP (ref 13–44)
MCHC RBC-ENTMCNC: 33.3 PG — SIGNIFICANT CHANGE UP (ref 27–34)
MCHC RBC-ENTMCNC: 34.2 GM/DL — SIGNIFICANT CHANGE UP (ref 32–36)
MCV RBC AUTO: 97.5 FL — SIGNIFICANT CHANGE UP (ref 80–100)
MONOCYTES # BLD AUTO: 1.19 K/UL — HIGH (ref 0–0.9)
MONOCYTES NFR BLD AUTO: 15 % — HIGH (ref 2–14)
NEUTROPHILS # BLD AUTO: 4.86 K/UL — SIGNIFICANT CHANGE UP (ref 1.8–7.4)
NEUTROPHILS NFR BLD AUTO: 61.3 % — SIGNIFICANT CHANGE UP (ref 43–77)
PLATELET # BLD AUTO: 298 K/UL — SIGNIFICANT CHANGE UP (ref 150–400)
POTASSIUM SERPL-MCNC: 3.9 MMOL/L — SIGNIFICANT CHANGE UP (ref 3.5–5.3)
POTASSIUM SERPL-MCNC: 4 MMOL/L — SIGNIFICANT CHANGE UP (ref 3.5–5.3)
POTASSIUM SERPL-MCNC: 4.1 MMOL/L — SIGNIFICANT CHANGE UP (ref 3.5–5.3)
POTASSIUM SERPL-MCNC: 4.2 MMOL/L — SIGNIFICANT CHANGE UP (ref 3.5–5.3)
POTASSIUM SERPL-SCNC: 3.9 MMOL/L — SIGNIFICANT CHANGE UP (ref 3.5–5.3)
POTASSIUM SERPL-SCNC: 4 MMOL/L — SIGNIFICANT CHANGE UP (ref 3.5–5.3)
POTASSIUM SERPL-SCNC: 4.1 MMOL/L — SIGNIFICANT CHANGE UP (ref 3.5–5.3)
POTASSIUM SERPL-SCNC: 4.2 MMOL/L — SIGNIFICANT CHANGE UP (ref 3.5–5.3)
PROCALCITONIN SERPL-MCNC: 0.08 NG/ML — SIGNIFICANT CHANGE UP (ref 0.02–0.1)
RBC # BLD: 3.99 M/UL — LOW (ref 4.2–5.8)
RBC # FLD: 12.2 % — SIGNIFICANT CHANGE UP (ref 10.3–14.5)
SODIUM SERPL-SCNC: 132 MMOL/L — LOW (ref 135–145)
SODIUM SERPL-SCNC: 133 MMOL/L — LOW (ref 135–145)
SODIUM SERPL-SCNC: 134 MMOL/L — LOW (ref 135–145)
SODIUM SERPL-SCNC: 136 MMOL/L — SIGNIFICANT CHANGE UP (ref 135–145)
WBC # BLD: 7.92 K/UL — SIGNIFICANT CHANGE UP (ref 3.8–10.5)
WBC # FLD AUTO: 7.92 K/UL — SIGNIFICANT CHANGE UP (ref 3.8–10.5)

## 2019-01-10 PROCEDURE — 99233 SBSQ HOSP IP/OBS HIGH 50: CPT

## 2019-01-10 PROCEDURE — 99232 SBSQ HOSP IP/OBS MODERATE 35: CPT

## 2019-01-10 RX ORDER — DIPHENHYDRAMINE HCL 50 MG
25 CAPSULE ORAL ONCE
Qty: 0 | Refills: 0 | Status: DISCONTINUED | OUTPATIENT
Start: 2019-01-10 | End: 2019-01-11

## 2019-01-10 RX ADMIN — LACOSAMIDE 100 MILLIGRAM(S): 50 TABLET ORAL at 18:00

## 2019-01-10 RX ADMIN — AMLODIPINE BESYLATE 10 MILLIGRAM(S): 2.5 TABLET ORAL at 05:02

## 2019-01-10 RX ADMIN — LACOSAMIDE 100 MILLIGRAM(S): 50 TABLET ORAL at 05:02

## 2019-01-10 RX ADMIN — Medication 1 MILLIGRAM(S): at 11:49

## 2019-01-10 RX ADMIN — Medication 1 TABLET(S): at 11:49

## 2019-01-10 RX ADMIN — Medication 500000 UNIT(S): at 05:02

## 2019-01-10 RX ADMIN — QUETIAPINE FUMARATE 12.5 MILLIGRAM(S): 200 TABLET, FILM COATED ORAL at 05:02

## 2019-01-10 RX ADMIN — ENOXAPARIN SODIUM 40 MILLIGRAM(S): 100 INJECTION SUBCUTANEOUS at 18:00

## 2019-01-10 RX ADMIN — Medication 1 PATCH: at 11:49

## 2019-01-10 RX ADMIN — DIVALPROEX SODIUM 250 MILLIGRAM(S): 500 TABLET, DELAYED RELEASE ORAL at 18:00

## 2019-01-10 RX ADMIN — Medication 105 MILLIGRAM(S): at 05:02

## 2019-01-10 RX ADMIN — Medication 1 PATCH: at 13:13

## 2019-01-10 RX ADMIN — DIVALPROEX SODIUM 250 MILLIGRAM(S): 500 TABLET, DELAYED RELEASE ORAL at 05:02

## 2019-01-10 RX ADMIN — Medication 8 MILLIGRAM(S): at 21:08

## 2019-01-10 RX ADMIN — QUETIAPINE FUMARATE 12.5 MILLIGRAM(S): 200 TABLET, FILM COATED ORAL at 19:41

## 2019-01-10 RX ADMIN — Medication 1 PATCH: at 00:43

## 2019-01-10 NOTE — PROGRESS NOTE ADULT - ASSESSMENT
63 year-old man with history of alcohol abuse was found wandering around attempting to enter a locked industrial facility on 1/4/18 and was taken to [facility] when he was noted to be confused. He was reported to be agitated, oriented only to self, no following commands but briskly moving all limbs. Imaging revealed multifocal contusions and subarachnoid hemorrhage. 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 Of note, he received 8mg of lorazepam at OSH. (04 Jan 2019 17:42)    PROCEDURE: 1/4 s/p ICP (intracranial pressure) monitoring      PLAN:  -Neuro: Oxy IR for pain control   -Fever: workup so far would be negative. Will get LED if he spikes again.   -Hyponatremia improved, wean off 2 % in am if sodium stable. 1.2 L FWR   -Ashley saw for agitation.   -Encouraged incentive spirometry   -Norvasc for HTN  -Colace senna for bowel regimen  -DVT ppx: venodynes and sql  -PT/OT acute TBI when stable     Will discuss above with Dr. Gabriel Ivory #[id number]     More than 30 minutes were spent educating the patient and family regarding condition, medications, follow up plans, signs and symptoms to be concerned with, preparing paperwork, and questions answered regarding discharge.     Assessment:  Please Check When Present   []  GCS  E   V  M     Heart Failure: []Acute, [] acute on chronic , []chronic  Heart Failure:  [] Diastolic (HFpEF), [] Systolic (HFrEF), []Combined (HFpEF and HFrEF), [] RHF, [] Pulm HTN, [] Other    [] SAMUEL, [] ATN, [] AIN, [] other  [] CKD1, [] CKD2, [] CKD 3, [] CKD 4, [] CKD 5, []ESRD    Encephalopathy: [] Metabolic, [] Hepatic, [] toxic, [x] Neurological, [] Other    Abnormal Nurtitional Status: [] malnurtition (see nutrition note), [ ]underweight: BMI < 19, [] morbid obesity: BMI >40, [] Cachexia    [] Sepsis  [] hypovolemic shock,[] cardiogenic shock, [] hemorrhagic shock, [] neuogenic shock  [] Acute Respiratory Failure  []Cerebral edema, [] Brain compression/ herniation,   [] Functional quadriplegia  [] Acute blood loss anemia

## 2019-01-10 NOTE — PROGRESS NOTE ADULT - SUBJECTIVE AND OBJECTIVE BOX
Authored by Dr Uriel Moreno [phone]  After hours or if no response please page Hospitalist service [phone]    Patient is a 63y old  Male who presents with a chief complaint of AMS, IPH (2019 11:48)    SUBJECTIVE / OVERNIGHT EVENTS: more alert, still complains of diffuse pain, HA, back pain. CIWA = 0    MEDICATIONS  (STANDING):  amLODIPine   Tablet 10 milliGRAM(s) Oral daily  diVALproex  milliGRAM(s) Oral two times a day  docusate sodium 100 milliGRAM(s) Oral three times a day  doxazosin 8 milliGRAM(s) Oral at bedtime  enoxaparin Injectable 40 milliGRAM(s) SubCutaneous <User Schedule>  folic acid 1 milliGRAM(s) Oral daily  influenza   Vaccine 0.5 milliLiter(s) IntraMuscular once  lacosamide 100 milliGRAM(s) Oral two times a day  multivitamin 1 Tablet(s) Oral daily  nicotine - 21 mG/24Hr(s) Patch 1 patch Transdermal daily  nystatin    Suspension 382742 Unit(s) Oral every 12 hours  QUEtiapine 12.5 milliGRAM(s) Oral two times a day  senna 2 Tablet(s) Oral at bedtime  sodium chloride 2% . 1000 milliLiter(s) (50 mL/Hr) IV Continuous <Continuous>    MEDICATIONS  (PRN):  acetaminophen   Tablet .. 650 milliGRAM(s) Oral every 6 hours PRN Temp greater or equal to 38C (100.4F), Mild Pain (1 - 3)  chlordiazePOXIDE 50 milliGRAM(s) Oral every 1 hour PRN CIWA-Ar score 8 or greater  haloperidol    Injectable 0.5 milliGRAM(s) IntraMuscular every 6 hours PRN Agitation  LORazepam     Tablet 2 milliGRAM(s) Oral every 2 hours PRN CIWA-Ar score increase by 2 points and a total score of 7 or less  LORazepam   Injectable 2 milliGRAM(s) IV Push every 2 hours PRN CIWA-Ar score increase by 2 points and a total score of 7 or less  oxyCODONE    IR 5 milliGRAM(s) Oral every 6 hours PRN Moderate Pain (4 - 6)  oxyCODONE    IR 10 milliGRAM(s) Oral every 6 hours PRN Severe Pain (7 - 10)      Vital Signs Last 24 Hrs  T(C): 37.3 (10 Bernardino 2019 11:00), Max: 37.7 (2019 21:11)  T(F): 99.2 (10 Bernardino 2019 11:00), Max: 99.9 (2019 23:21)  HR: 84 (10 Bernardino 2019 11:00) (78 - 100)  BP: 121/79 (10 Bernardino 2019 11:00) (121/79 - 137/76)  BP(mean): --  RR: 18 (10 Bernardino 2019 11:00) (18 - 18)  SpO2: 95% (10 Bernardino 2019 11:00) (94% - 95%)  CAPILLARY BLOOD GLUCOSE        I&O's Summary    2019 07:01  -  10 Bernardino 2019 07:00  --------------------------------------------------------  IN: 860 mL / OUT: 1100 mL / NET: -240 mL        PHYSICAL EXAM  GENERAL: NAD, more alert than yesterday  HEAD:  L ICP bolt removed w/staples   EYES: EOMI, PERRLA, conjunctiva and sclera clear  ENMT: Moist mucous membranes  NECK: Supple, No JVD  RESPIRATORY: Clear to auscultation bilaterally; No rales, rhonchi, wheezing, or rubs  CARDIOVASCULAR: Regular rate and rhythm; No murmurs, rubs, or gallops  GASTROINTESTINAL: voluntary guarding no rebound tender in lower abdomen BS+  EXTREMITIES:  2+ Peripheral Pulses, No clubbing, cyanosis, or edema  NEURO:  Alert & Oriented X3 (person time); Moving all 4 extremities; No gross sensory deficits.   SKIN: erythema of chest.    LABS:                        13.3   7.92  )-----------( 298      ( 10 Bernardino 2019 08:29 )             38.9     01-10    136  |  101  |  13  ----------------------------<  105<H>  3.9   |  23  |  0.72    Ca    9.0      10 Bernardino 2019 06:48  Phos  3.7     01-08  Mg     1.9     01-08    TPro  6.9  /  Alb  3.5  /  TBili  1.2  /  DBili  0.2  /  AST  97<H>  /  ALT  108<H>  /  AlkPhos  53        CARDIAC MARKERS ( 2019 06:03 )  x     / x     / 470 U/L / x     / x        Urinalysis Basic - ( 2019 15:38 )    Color: Yellow / Appearance: Clear / S.017 / pH: x  Gluc: x / Ketone: Trace  / Bili: Negative / Urobili: Negative   Blood: x / Protein: Trace / Nitrite: Negative   Leuk Esterase: Negative / RBC: x / WBC x   Sq Epi: x / Non Sq Epi: x / Bacteria: x        Culture - Blood (collected 2019 01:02)  Source: .Blood Blood  Preliminary Report (10 Bernardino 2019 02:01):    No growth to date.    Culture - Blood (collected 2019 01:02)  Source: .Blood Blood  Preliminary Report (10 Bernardino 2019 02:01):    No growth to date.    Procalcitonin, Serum: 0.08: This assay is intended for use to determine the change of PCT over time  as an aid in assessing the cumulative 28-day risk of all-cause mortality  for patients diagnosed with severe sepsis or septic shock in the ICU, or  when obtained in the emergency department or other medical wards prior to  ICU admission. This assay was performed by the Roche [employer]S PCT  assay. ng/mL (01.10.19 @ 06:48)        RADIOLOGY & ADDITIONAL TESTS:    Imaging Personally Reviewed: < from: Xray Chest 1 View- PORTABLE-Urgent (19 @ 09:53) >  IMPRESSION:   New left opacity, which may represent atelectasis or pneumonia.     < end of copied text >    Consultant(s) Notes Reviewed:  psych  Care Discussed with Consultants/Other Providers: ROCIO pollock fever, hypona

## 2019-01-10 NOTE — PROGRESS NOTE ADULT - PROBLEM SELECTOR PLAN 2
2% per NSGY, agree w/titrating to avoid increase of greater than 8 in 24 hrs  bmp at least q8 2% per NSGY - wean to salt tabs w/improved Na  would add 1.2L fluid restriction

## 2019-01-10 NOTE — PROGRESS NOTE ADULT - SUBJECTIVE AND OBJECTIVE BOX
SUBJECTIVE: Patient seen and examined at bedside. Denies any complaints at this time.     OVERNIGHT EVENTS: none     Vital Signs Last 24 Hrs  T(C): 37.3 (10 Bernardino 2019 11:00), Max: 37.7 (09 Jan 2019 21:11)  T(F): 99.2 (10 Bernardino 2019 11:00), Max: 99.9 (09 Jan 2019 23:21)  HR: 84 (10 Bernardino 2019 11:00) (78 - 100)  BP: 121/79 (10 Bernardino 2019 11:00) (121/79 - 137/76)  BP(mean): --  RR: 18 (10 Bernardino 2019 11:00) (18 - 18)  SpO2: 95% (10 Bernardino 2019 11:00) (94% - 95%)    PHYSICAL EXAM:    General: No Acute Distress     Neurological: Alert, oriented to self and month/year, intermittently to place, Following simple Commands, LUE drift. LUE 4/5, RUE 5/5    Pulmonary: Clear to Auscultation, No Rales, No Rhonchi, No Wheezes     Cardiovascular: S1, S2, Regular Rate and Rhythm     Gastrointestinal: Soft, Nontender, Nondistended     Incision: + staples, c/d/i     LABS:                         13.3   7.92  )-----------( 298      ( 10 Bernardino 2019 08:29 )             38.9     01-10    134<L>  |  99  |  12  ----------------------------<  89  4.0   |  25  |  0.74    Ca    9.2      10 Bernardino 2019 13:04  Phos  3.7     01-08  Mg     1.9     01-08    TPro  6.9  /  Alb  3.5  /  TBili  1.2  /  DBili  0.2  /  AST  97<H>  /  ALT  108<H>  /  AlkPhos  53  01-09          DIET: soft diet     IMAGING: < from: Xray Chest 1 View- PORTABLE-Urgent (01.09.19 @ 09:53) >  New left opacity, which may represent atelectasis or pneumonia.     < end of copied text >

## 2019-01-11 LAB
ANION GAP SERPL CALC-SCNC: 13 MMOL/L — SIGNIFICANT CHANGE UP (ref 5–17)
ANION GAP SERPL CALC-SCNC: 15 MMOL/L — SIGNIFICANT CHANGE UP (ref 5–17)
ANION GAP SERPL CALC-SCNC: 9 MMOL/L — SIGNIFICANT CHANGE UP (ref 5–17)
BUN SERPL-MCNC: 13 MG/DL — SIGNIFICANT CHANGE UP (ref 7–23)
BUN SERPL-MCNC: 13 MG/DL — SIGNIFICANT CHANGE UP (ref 7–23)
BUN SERPL-MCNC: 15 MG/DL — SIGNIFICANT CHANGE UP (ref 7–23)
CALCIUM SERPL-MCNC: 9 MG/DL — SIGNIFICANT CHANGE UP (ref 8.4–10.5)
CALCIUM SERPL-MCNC: 9.2 MG/DL — SIGNIFICANT CHANGE UP (ref 8.4–10.5)
CALCIUM SERPL-MCNC: 9.2 MG/DL — SIGNIFICANT CHANGE UP (ref 8.4–10.5)
CHLORIDE SERPL-SCNC: 102 MMOL/L — SIGNIFICANT CHANGE UP (ref 96–108)
CHLORIDE SERPL-SCNC: 96 MMOL/L — SIGNIFICANT CHANGE UP (ref 96–108)
CHLORIDE SERPL-SCNC: 98 MMOL/L — SIGNIFICANT CHANGE UP (ref 96–108)
CO2 SERPL-SCNC: 22 MMOL/L — SIGNIFICANT CHANGE UP (ref 22–31)
CO2 SERPL-SCNC: 23 MMOL/L — SIGNIFICANT CHANGE UP (ref 22–31)
CO2 SERPL-SCNC: 25 MMOL/L — SIGNIFICANT CHANGE UP (ref 22–31)
CREAT SERPL-MCNC: 0.7 MG/DL — SIGNIFICANT CHANGE UP (ref 0.5–1.3)
CREAT SERPL-MCNC: 0.7 MG/DL — SIGNIFICANT CHANGE UP (ref 0.5–1.3)
CREAT SERPL-MCNC: 0.76 MG/DL — SIGNIFICANT CHANGE UP (ref 0.5–1.3)
GLUCOSE SERPL-MCNC: 132 MG/DL — HIGH (ref 70–99)
GLUCOSE SERPL-MCNC: 152 MG/DL — HIGH (ref 70–99)
GLUCOSE SERPL-MCNC: 160 MG/DL — HIGH (ref 70–99)
POTASSIUM SERPL-MCNC: 3.5 MMOL/L — SIGNIFICANT CHANGE UP (ref 3.5–5.3)
POTASSIUM SERPL-MCNC: 3.8 MMOL/L — SIGNIFICANT CHANGE UP (ref 3.5–5.3)
POTASSIUM SERPL-MCNC: 4.1 MMOL/L — SIGNIFICANT CHANGE UP (ref 3.5–5.3)
POTASSIUM SERPL-SCNC: 3.5 MMOL/L — SIGNIFICANT CHANGE UP (ref 3.5–5.3)
POTASSIUM SERPL-SCNC: 3.8 MMOL/L — SIGNIFICANT CHANGE UP (ref 3.5–5.3)
POTASSIUM SERPL-SCNC: 4.1 MMOL/L — SIGNIFICANT CHANGE UP (ref 3.5–5.3)
SODIUM SERPL-SCNC: 133 MMOL/L — LOW (ref 135–145)
SODIUM SERPL-SCNC: 134 MMOL/L — LOW (ref 135–145)
SODIUM SERPL-SCNC: 136 MMOL/L — SIGNIFICANT CHANGE UP (ref 135–145)

## 2019-01-11 PROCEDURE — 99232 SBSQ HOSP IP/OBS MODERATE 35: CPT

## 2019-01-11 PROCEDURE — 99222 1ST HOSP IP/OBS MODERATE 55: CPT | Mod: GC

## 2019-01-11 RX ORDER — DIVALPROEX SODIUM 500 MG/1
250 TABLET, DELAYED RELEASE ORAL
Qty: 0 | Refills: 0 | Status: COMPLETED | OUTPATIENT
Start: 2019-01-11 | End: 2019-01-13

## 2019-01-11 RX ORDER — NICOTINE POLACRILEX 2 MG
1 GUM BUCCAL
Qty: 0 | Refills: 0 | Status: DISCONTINUED | OUTPATIENT
Start: 2019-01-11 | End: 2019-01-16

## 2019-01-11 RX ORDER — QUETIAPINE FUMARATE 200 MG/1
12.5 TABLET, FILM COATED ORAL
Qty: 0 | Refills: 0 | Status: DISCONTINUED | OUTPATIENT
Start: 2019-01-11 | End: 2019-01-12

## 2019-01-11 RX ADMIN — QUETIAPINE FUMARATE 12.5 MILLIGRAM(S): 200 TABLET, FILM COATED ORAL at 21:53

## 2019-01-11 RX ADMIN — Medication 1 PATCH: at 21:56

## 2019-01-11 RX ADMIN — Medication 100 MILLIGRAM(S): at 11:31

## 2019-01-11 RX ADMIN — Medication 100 MILLIGRAM(S): at 21:53

## 2019-01-11 RX ADMIN — Medication 1 TABLET(S): at 11:31

## 2019-01-11 RX ADMIN — AMLODIPINE BESYLATE 10 MILLIGRAM(S): 2.5 TABLET ORAL at 05:31

## 2019-01-11 RX ADMIN — LACOSAMIDE 100 MILLIGRAM(S): 50 TABLET ORAL at 17:08

## 2019-01-11 RX ADMIN — Medication 100 MILLIGRAM(S): at 05:31

## 2019-01-11 RX ADMIN — Medication 1 PATCH: at 11:31

## 2019-01-11 RX ADMIN — Medication 1 PATCH: at 07:42

## 2019-01-11 RX ADMIN — Medication 500000 UNIT(S): at 05:31

## 2019-01-11 RX ADMIN — Medication 1 EACH: at 17:08

## 2019-01-11 RX ADMIN — Medication 1 MILLIGRAM(S): at 11:31

## 2019-01-11 RX ADMIN — QUETIAPINE FUMARATE 12.5 MILLIGRAM(S): 200 TABLET, FILM COATED ORAL at 05:31

## 2019-01-11 RX ADMIN — Medication 500000 UNIT(S): at 17:08

## 2019-01-11 RX ADMIN — Medication 1 PATCH: at 11:00

## 2019-01-11 RX ADMIN — DIVALPROEX SODIUM 250 MILLIGRAM(S): 500 TABLET, DELAYED RELEASE ORAL at 05:31

## 2019-01-11 RX ADMIN — LACOSAMIDE 100 MILLIGRAM(S): 50 TABLET ORAL at 05:31

## 2019-01-11 RX ADMIN — Medication 8 MILLIGRAM(S): at 21:53

## 2019-01-11 RX ADMIN — DIVALPROEX SODIUM 250 MILLIGRAM(S): 500 TABLET, DELAYED RELEASE ORAL at 21:53

## 2019-01-11 RX ADMIN — SENNA PLUS 2 TABLET(S): 8.6 TABLET ORAL at 21:53

## 2019-01-11 RX ADMIN — ENOXAPARIN SODIUM 40 MILLIGRAM(S): 100 INJECTION SUBCUTANEOUS at 17:08

## 2019-01-11 NOTE — PROGRESS NOTE ADULT - SUBJECTIVE AND OBJECTIVE BOX
Authored by Dr Uriel Moreno [phone]  After hours or if no response please page Hospitalist service [phone]    Patient is a 63y old  Male who presents with a chief complaint of AMS, IPH (2019 12:29)    SUBJECTIVE / OVERNIGHT EVENTS: daughter at bedside. pt has no new complains - persistent pain. but otherwise feels well more alert    MEDICATIONS  (STANDING):  amLODIPine   Tablet 10 milliGRAM(s) Oral daily  diVALproex  milliGRAM(s) Oral <User Schedule>  docusate sodium 100 milliGRAM(s) Oral three times a day  doxazosin 8 milliGRAM(s) Oral at bedtime  enoxaparin Injectable 40 milliGRAM(s) SubCutaneous <User Schedule>  folic acid 1 milliGRAM(s) Oral daily  influenza   Vaccine 0.5 milliLiter(s) IntraMuscular once  lacosamide 100 milliGRAM(s) Oral two times a day  multivitamin 1 Tablet(s) Oral daily  nicotine - 21 mG/24Hr(s) Patch 1 patch Transdermal daily  nicotine - Inhaler 1 Each Inhalation two times a day  nystatin    Suspension 318927 Unit(s) Oral every 12 hours  QUEtiapine 12.5 milliGRAM(s) Oral <User Schedule>  senna 2 Tablet(s) Oral at bedtime  sodium chloride 2% . 1000 milliLiter(s) (20 mL/Hr) IV Continuous <Continuous>    MEDICATIONS  (PRN):  acetaminophen   Tablet .. 650 milliGRAM(s) Oral every 6 hours PRN Temp greater or equal to 38C (100.4F), Mild Pain (1 - 3)      Vital Signs Last 24 Hrs  T(C): 37.2 (2019 11:45), Max: 37.3 (2019 07:39)  T(F): 99 (2019 11:45), Max: 99.1 (2019 07:39)  HR: 91 (2019 11:45) (91 - 98)  BP: 130/81 (2019 11:45) (111/69 - 133/85)  BP(mean): --  RR: 18 (2019 11:45) (18 - 18)  SpO2: 96% (2019 11:45) (96% - 97%)  CAPILLARY BLOOD GLUCOSE        I&O's Summary    10 Bernardino 2019 07: 07:00  --------------------------------------------------------  IN: 850 mL / OUT: 0 mL / NET: 850 mL    2019 07:  -  2019 14:45  --------------------------------------------------------  IN: 300 mL / OUT: 0 mL / NET: 300 mL        PHYSICAL EXAM  GENERAL: NAD, more alert than yesterday  HEAD:  L ICP bolt removed w/staples   EYES: EOMI, PERRLA, conjunctiva and sclera clear  ENMT: Moist mucous membranes  NECK: Supple, No JVD  RESPIRATORY: Clear to auscultation bilaterally; No rales, rhonchi, wheezing, or rubs  CARDIOVASCULAR: Regular rate and rhythm; No murmurs, rubs, or gallops  GASTROINTESTINAL: voluntary guarding no rebound tender in lower abdomen BS+  EXTREMITIES:  2+ Peripheral Pulses, No clubbing, cyanosis, or edema  NEURO:  Alert & Oriented X3 (person time); Moving all 4 extremities; No gross sensory deficits.   SKIN: erythema of chest.    LABS:                        13.3   7.92  )-----------( 298      ( 10 Bernardino 2019 08:29 )             38.9     01-11    136  |  102  |  13  ----------------------------<  132<H>  3.8   |  25  |  0.70    Ca    9.2      2019 12:19    Urinalysis Basic - ( 2019 15:38 )    Color: Yellow / Appearance: Clear / S.017 / pH: x  Gluc: x / Ketone: Trace  / Bili: Negative / Urobili: Negative   Blood: x / Protein: Trace / Nitrite: Negative   Leuk Esterase: Negative / RBC: x / WBC x   Sq Epi: x / Non Sq Epi: x / Bacteria: x        Culture - Blood (collected 2019 01:02)  Source: .Blood Blood  Preliminary Report (10 Bernardino 2019 02:01):    No growth to date.    Culture - Blood (collected 2019 01:02)  Source: .Blood Blood  Preliminary Report (10 Bernardino 2019 02:01):    No growth to date.        RADIOLOGY & ADDITIONAL TESTS:    Imaging Personally Reviewed:  Consultant(s) Notes Reviewed:  PMR  Care Discussed with Consultants/Other Providers:

## 2019-01-11 NOTE — PROGRESS NOTE ADULT - ASSESSMENT
63M w/alcohol abuse has traumatic SDH SAH brain contusion s/p ICP bolt, intubated, bolt discontinued 1/6/19  course c/b agitation, delirium related alcohol withdrawal, encephalopathy,  fevers, hyponatremia      Plan    Neuro stable. D/c Vimpat today. no seizures   Delirium improved. Taper off depakote and seroquel    Hyponatremia improving _ off 2%. am BMP  Vitals stable- On norvasc  DVT ppx  PT/ PM&R-a/c TBI rehab. Undocumented/ uninsured. [facility] rehab vs daily PT/OT optimization for discharge home. d/w SW and PT.

## 2019-01-11 NOTE — PROGRESS NOTE ADULT - SUBJECTIVE AND OBJECTIVE BOX
SUBJECTIVE:     OVERNIGHT EVENTS: none    Vital Signs Last 24 Hrs  T(C): 37.2 (11 Jan 2019 11:45), Max: 37.3 (11 Jan 2019 07:39)  T(F): 99 (11 Jan 2019 11:45), Max: 99.1 (11 Jan 2019 07:39)  HR: 91 (11 Jan 2019 11:45) (91 - 98)  BP: 130/81 (11 Jan 2019 11:45) (111/69 - 133/85)  BP(mean): --  RR: 18 (11 Jan 2019 11:45) (18 - 18)  SpO2: 96% (11 Jan 2019 11:45) (96% - 97%)    PHYSICAL EXAM:    Constitutional: No Acute Distress     Neurological: Alert oriented x 2-3. Speech clear Following Commands, Moving all Extremities 5/5. No drift appreciated . Staples ICP bolt site.     Pulmonary: Clear to Auscultation,     Cardiovascular: S1, S2, Regular rate and rhythm     Gastrointestinal: Soft, Non-tender, Non-distended     Extremities: No calf tenderness     LABS:                        13.3   7.92  )-----------( 298      ( 10 Bernardino 2019 08:29 )             38.9    01-11    136  |  102  |  13  ----------------------------<  132<H>  3.8   |  25  |  0.70    Ca    9.2      11 Jan 2019 12:19            IMAGING:         MEDICATIONS:  Antibiotics:  nystatin    Suspension [id number] Unit(s) Oral every 12 hours  acetaminophen   Tablet .. 650 milliGRAM(s) Oral every 6 hours PRN Temp greater or equal to 38C (100.4F), Mild Pain (1 - 3)  diVALproex  milliGRAM(s) Oral <User Schedule>  lacosamide 100 milliGRAM(s) Oral two times a day  QUEtiapine 12.5 milliGRAM(s) Oral <User Schedule>  amLODIPine   Tablet 10 milliGRAM(s) Oral daily  doxazosin 8 milliGRAM(s) Oral at bedtim  docusate sodium 100 milliGRAM(s) Oral three times a day  senna 2 Tablet(s) Oral at bedtime  enoxaparin Injectable 40 milliGRAM(s) SubCutaneous <User Schedule>  folic acid 1 milliGRAM(s) Oral daily  influenza   Vaccine 0.5 milliLiter(s) IntraMuscular once  multivitamin 1 Tablet(s) Oral daily  nicotine - 21 mG/24Hr(s) Patch 1 patch Transdermal daily  nicotine - Inhaler 1 Each Inhalation two times a day      DIET:

## 2019-01-11 NOTE — CONSULT NOTE ADULT - ATTENDING COMMENTS
Seen and examined with resident. Agree with note.   Patient with encephalopathy and gait dysfunction.  Patient will need acute TBI rehabilitation when stable.

## 2019-01-11 NOTE — CONSULT NOTE ADULT - ASSESSMENT
------------------------------------------------------------------------------------------------  ASSESSMENT/RECOMMENDATIONS  62yo Male with functional and cognitive deficits after IPH with diffuse SAH and IVH.    #PT - strengthening, transfers, gait training  #OT - ADLs  #SLP - currently on soft diet per swallow eval, maintain aspiration precautions  #Pain - Tylenol prn  #DVT PPX - Lovenox  #Dispo - ------------------------------------------------------------------------------------------------  ASSESSMENT/RECOMMENDATIONS  62yo Male with functional and cognitive deficits after IPH with diffuse SAH and IVH.    #PT - strengthening, transfers, gait training  #OT - ADLs  #SLP - currently on soft diet per swallow eval, maintain aspiration precautions  #Pain - Tylenol prn  #DVT PPX - Lovenox  #Dispo - WHEN MEDICALLY STABLE, would recommend ACUTE inpatient rehabilitation for the functional deficits consisting of 3 hours of therapy/day & 24 hour RN/daily PMR physician for comorbid medical management. Will continue to follow for ongoing rehab needs and recommendations. Patient will be able to tolerate 3 hours a day.

## 2019-01-11 NOTE — CONSULT NOTE ADULT - SUBJECTIVE AND OBJECTIVE BOX
Patient is a 63y old  Male who presents with a chief complaint of AMS, IPH (10 Bernardino 2019 14:03)      HPI:  62yo M with PMH of alcohol abuse who presented initially to [facility] and then transferred to [facility] on 19 after being found wandering around attempting to enter a locked industrial facility. He was reportedly agitated, oriented only to self, but not following commands. Imaging at [facility] revealed multifocal contusions and subarachnoid hemorrhage with possible AVM so he was transferred to [facility] for further management. CT Head at [facility] revealed bilateral frontal and bilateral temporal lobe intraparenchymal hemorrhages, diffuse subarachnoid hemorrhage with intraventricular extension with 3 mm leftward shift, there are bilateral subdural hemorrhages with lateral extension along the tentorial leaflets and falx measuring up to 6 mm in greatest thickness on the left and 4 mm in greatest thickness on the right. Evaluated by Neurosurgery but did not recommend evacuation. ICP monitor placed and patient monitored in NSICU. Initially intubated for airway protection, extubated . Swallow eval  recommendation for soft diet. Patient with continued acute encephalopathy/delirium thought to be multifactorial, prior EtOH abuse and IPH; placed on 2% NS to improve hyponatremia as contributing cause as well. Continued on CIWA protocol, Psychiatry consulted for agitation and patient started on Depakote and Seroquel.    REVIEW OF SYSTEMS: limited due to delirium/cognitive deficits.      PAST MEDICAL & SURGICAL HISTORY  EtOH abuse    SOCIAL HISTORY  Smoking - Denied  EtOH - +alcohol use  Drugs - Denied    FUNCTIONAL HISTORY  Lives alone in basement apt with 7 RAULITO.  Independent with ADLs and functional mobility prior to admission.    CURRENT FUNCTIONAL STATUS  Bed mobility min A  Transfers min A  Ambulated 25ft x 2 with RW min A    FAMILY HISTORY   Reviewed and non-contributory to present illness    RECENT LABS/IMAGING  CBC Full  -  ( 10 Bernardino 2019 08:29 )  WBC Count : 7.92 K/uL  Hemoglobin : 13.3 g/dL  Hematocrit : 38.9 %  Platelet Count - Automated : 298 K/uL  Mean Cell Volume : 97.5 fl  Mean Cell Hemoglobin : 33.3 pg  Mean Cell Hemoglobin Concentration : 34.2 gm/dL  Auto Neutrophil # : 4.86 K/uL  Auto Lymphocyte # : 1.70 K/uL  Auto Monocyte # : 1.19 K/uL  Auto Eosinophil # : 0.12 K/uL  Auto Basophil # : 0.03 K/uL  Auto Neutrophil % : 61.3 %  Auto Lymphocyte % : 21.5 %  Auto Monocyte % : 15.0 %  Auto Eosinophil % : 1.5 %  Auto Basophil % : 0.4 %    -11    134<L>  |  98  |  13  ----------------------------<  152<H>  3.5   |  23  |  0.70    Ca    9.0      2019 05:36    Urinalysis Basic - ( 2019 15:38 )    Color: Yellow / Appearance: Clear / S.017 / pH: x  Gluc: x / Ketone: Trace  / Bili: Negative / Urobili: Negative   Blood: x / Protein: Trace / Nitrite: Negative   Leuk Esterase: Negative / RBC: x / WBC x   Sq Epi: x / Non Sq Epi: x / Bacteria: x    ALLERGIES  No Known Allergies    MEDICATIONS   acetaminophen   Tablet .. 650 milliGRAM(s) Oral every 6 hours PRN  amLODIPine   Tablet 10 milliGRAM(s) Oral daily  diVALproex  milliGRAM(s) Oral <User Schedule>  docusate sodium 100 milliGRAM(s) Oral three times a day  doxazosin 8 milliGRAM(s) Oral at bedtime  enoxaparin Injectable 40 milliGRAM(s) SubCutaneous <User Schedule>  folic acid 1 milliGRAM(s) Oral daily  influenza   Vaccine 0.5 milliLiter(s) IntraMuscular once  lacosamide 100 milliGRAM(s) Oral two times a day  multivitamin 1 Tablet(s) Oral daily  nicotine - 21 mG/24Hr(s) Patch 1 patch Transdermal daily  nicotine - Inhaler 1 Each Inhalation two times a day  nystatin    Suspension 942888 Unit(s) Oral every 12 hours  QUEtiapine 12.5 milliGRAM(s) Oral <User Schedule>  senna 2 Tablet(s) Oral at bedtime  sodium chloride 2% . 1000 milliLiter(s) IV Continuous <Continuous> Patient is a 63y old  Male who presents with a chief complaint of AMS, IPH (10 Bernardino 2019 14:03)      HPI:  62yo right hand dominant M with PMH of alcohol abuse who presented initially to [facility] and then transferred to [facility] on 19 after being found wandering around attempting to enter a locked industrial facility. He was reportedly agitated, oriented only to self, but not following commands. Imaging at [facility] revealed multifocal contusions and subarachnoid hemorrhage with possible AVM so he was transferred to [facility] for further management. CT Head at [facility] revealed bilateral frontal and bilateral temporal lobe intraparenchymal hemorrhages, diffuse subarachnoid hemorrhage with intraventricular extension with 3 mm leftward shift, there are bilateral subdural hemorrhages with lateral extension along the tentorial leaflets and falx measuring up to 6 mm in greatest thickness on the left and 4 mm in greatest thickness on the right. Evaluated by Neurosurgery but did not recommend evacuation. ICP monitor placed and patient monitored in NSICU. Initially intubated for airway protection, extubated . Swallow eval  recommendation for soft diet. Patient with continued acute encephalopathy/delirium thought to be multifactorial, prior EtOH abuse and IPH; placed on 2% NS to improve hyponatremia as contributing cause as well. Continued on CIWA protocol, Psychiatry consulted for agitation and patient started on Depakote and Seroquel.    REVIEW OF SYSTEMS: denies fevers, chills, HA, SOB, chest pain, N/V/D/C      PAST MEDICAL & SURGICAL HISTORY  EtOH abuse    SOCIAL HISTORY  Smoking - Denied  EtOH - +alcohol use  Drugs - Denied    FUNCTIONAL HISTORY  Lives alone in basement apt with 7 [address]. Has 2 daughters but they do not live near him.  Independent with ADLs and functional mobility prior to admission.    CURRENT FUNCTIONAL STATUS  Bed mobility min A  Transfers min A  Ambulated 25ft x 2 with RW min A    FAMILY HISTORY   Reviewed and non-contributory to present illness    RECENT LABS/IMAGING  CBC Full  -  ( 10 Bernardino 2019 08:29 )  WBC Count : 7.92 K/uL  Hemoglobin : 13.3 g/dL  Hematocrit : 38.9 %  Platelet Count - Automated : 298 K/uL  Mean Cell Volume : 97.5 fl  Mean Cell Hemoglobin : 33.3 pg  Mean Cell Hemoglobin Concentration : 34.2 gm/dL  Auto Neutrophil # : 4.86 K/uL  Auto Lymphocyte # : 1.70 K/uL  Auto Monocyte # : 1.19 K/uL  Auto Eosinophil # : 0.12 K/uL  Auto Basophil # : 0.03 K/uL  Auto Neutrophil % : 61.3 %  Auto Lymphocyte % : 21.5 %  Auto Monocyte % : 15.0 %  Auto Eosinophil % : 1.5 %  Auto Basophil % : 0.4 %    11    134<L>  |  98  |  13  ----------------------------<  152<H>  3.5   |  23  |  0.70    Ca    9.0      2019 05:36    Urinalysis Basic - ( 2019 15:38 )    Color: Yellow / Appearance: Clear / S.017 / pH: x  Gluc: x / Ketone: Trace  / Bili: Negative / Urobili: Negative   Blood: x / Protein: Trace / Nitrite: Negative   Leuk Esterase: Negative / RBC: x / WBC x   Sq Epi: x / Non Sq Epi: x / Bacteria: x    ALLERGIES  No Known Allergies    MEDICATIONS   acetaminophen   Tablet .. 650 milliGRAM(s) Oral every 6 hours PRN  amLODIPine   Tablet 10 milliGRAM(s) Oral daily  diVALproex  milliGRAM(s) Oral <User Schedule>  docusate sodium 100 milliGRAM(s) Oral three times a day  doxazosin 8 milliGRAM(s) Oral at bedtime  enoxaparin Injectable 40 milliGRAM(s) SubCutaneous <User Schedule>  folic acid 1 milliGRAM(s) Oral daily  influenza   Vaccine 0.5 milliLiter(s) IntraMuscular once  lacosamide 100 milliGRAM(s) Oral two times a day  multivitamin 1 Tablet(s) Oral daily  nicotine - 21 mG/24Hr(s) Patch 1 patch Transdermal daily  nicotine - Inhaler 1 Each Inhalation two times a day  nystatin    Suspension 942304 Unit(s) Oral every 12 hours  QUEtiapine 12.5 milliGRAM(s) Oral <User Schedule>  senna 2 Tablet(s) Oral at bedtime  sodium chloride 2% . 1000 milliLiter(s) IV Continuous <Continuous>    ----------------------------------------------------------------------------------------  T(C): 37.2 (19 @ 11:45), Max: 37.3 (19 @ 07:39)  HR: 91 (19 @ 11:45) (91 - 98)  BP: 130/81 (19 @ 11:45) (111/69 - 133/85)  RR: 18 (19 @ 11:45) (18 - 18)  SpO2: 96% (19 @ 11:45) (96% - 97%)    PHYSICAL EXAMINATION  Constitutional - NAD, Comfortable  HEENT - NCAT, EOMI  Chest - Breathing comfortably, No wheezing  Cardiovascular - S1S2   Abdomen - Soft   Extremities - No C/C/E, No calf tenderness   Neurologic Exam -                    Cognitive - Awake, Alert, Oriented to self, place, date     Communication - Fluent, No dysarthria; naming and repetition intact     Cranial Nerves - CN 2-12 intact     Tone - Normal     Motor -                     LEFT    UE - ShAB 4/5, EF 4/5, EE 4/5, WE 5/5,  5/5                    LEFT    LE - HF 5/5, KE 5/5, DF 5/5, PF 5/5                    RIGHT UE - ShAB 5/5, EF 5/5, EE 5/5, WE 5/5,  5/5                    RIGHT LE - HF 5/5, KE 5/5, DF 5/5, PF 5/5        Sensory - Intact to LT B/L     Reflexes - DTR Intact, No primitive reflexes  Psychiatric - Mood stable, Affect WNL Patient is a 63y old  Male who presents with a chief complaint of AMS, IPH (10 Bernardino 2019 14:03)    HPI:  62yo right hand dominant M with PMH of alcohol abuse who presented initially to [facility] and then transferred to [facility] on 19 after being found wandering around attempting to enter a locked industrial facility. He was reportedly agitated, oriented only to self, but not following commands. Imaging at [facility] revealed multifocal contusions and subarachnoid hemorrhage with possible AVM so he was transferred to [facility] for further management. CT Head at [facility] revealed bilateral frontal and bilateral temporal lobe intraparenchymal hemorrhages, diffuse subarachnoid hemorrhage with intraventricular extension with 3 mm leftward shift, there are bilateral subdural hemorrhages with lateral extension along the tentorial leaflets and falx measuring up to 6 mm in greatest thickness on the left and 4 mm in greatest thickness on the right. Evaluated by Neurosurgery but did not recommend evacuation. ICP monitor placed and patient monitored in NSICU. Initially intubated for airway protection, extubated . Swallow eval  recommendation for soft diet. Patient with continued acute encephalopathy/delirium thought to be multifactorial, prior EtOH abuse and IPH; placed on 2% NS to improve hyponatremia as contributing cause as well. Continued on CIWA protocol, Psychiatry consulted for agitation and patient started on Depakote and Seroquel.    REVIEW OF SYSTEMS: denies fevers, chills, HA, SOB, chest pain, N/V/D/C      PAST MEDICAL & SURGICAL HISTORY  EtOH abuse    SOCIAL HISTORY  Smoking - Denied  EtOH - +alcohol use  Drugs - Denied    FUNCTIONAL HISTORY  Lives alone in basement apt with 7 RAUL[address]. Has 2 daughters but they do not live near him.  Independent with ADLs and functional mobility prior to admission.    CURRENT FUNCTIONAL STATUS  Bed mobility min A  Transfers min A  Ambulated 25ft x 2 with RW min A    FAMILY HISTORY   Reviewed and non-contributory to present illness    RECENT LABS/IMAGING  CBC Full  -  ( 10 Bernardino 2019 08:29 )  WBC Count : 7.92 K/uL  Hemoglobin : 13.3 g/dL  Hematocrit : 38.9 %  Platelet Count - Automated : 298 K/uL  Mean Cell Volume : 97.5 fl  Mean Cell Hemoglobin : 33.3 pg  Mean Cell Hemoglobin Concentration : 34.2 gm/dL  Auto Neutrophil # : 4.86 K/uL  Auto Lymphocyte # : 1.70 K/uL  Auto Monocyte # : 1.19 K/uL  Auto Eosinophil # : 0.12 K/uL  Auto Basophil # : 0.03 K/uL  Auto Neutrophil % : 61.3 %  Auto Lymphocyte % : 21.5 %  Auto Monocyte % : 15.0 %  Auto Eosinophil % : 1.5 %  Auto Basophil % : 0.4 %    11    134<L>  |  98  |  13  ----------------------------<  152<H>  3.5   |  23  |  0.70    Ca    9.0      2019 05:36    Urinalysis Basic - ( 2019 15:38 )    Color: Yellow / Appearance: Clear / S.017 / pH: x  Gluc: x / Ketone: Trace  / Bili: Negative / Urobili: Negative   Blood: x / Protein: Trace / Nitrite: Negative   Leuk Esterase: Negative / RBC: x / WBC x   Sq Epi: x / Non Sq Epi: x / Bacteria: x    ALLERGIES  No Known Allergies    MEDICATIONS   acetaminophen   Tablet .. 650 milliGRAM(s) Oral every 6 hours PRN  amLODIPine   Tablet 10 milliGRAM(s) Oral daily  diVALproex  milliGRAM(s) Oral <User Schedule>  docusate sodium 100 milliGRAM(s) Oral three times a day  doxazosin 8 milliGRAM(s) Oral at bedtime  enoxaparin Injectable 40 milliGRAM(s) SubCutaneous <User Schedule>  folic acid 1 milliGRAM(s) Oral daily  influenza   Vaccine 0.5 milliLiter(s) IntraMuscular once  lacosamide 100 milliGRAM(s) Oral two times a day  multivitamin 1 Tablet(s) Oral daily  nicotine - 21 mG/24Hr(s) Patch 1 patch Transdermal daily  nicotine - Inhaler 1 Each Inhalation two times a day  nystatin    Suspension 784422 Unit(s) Oral every 12 hours  QUEtiapine 12.5 milliGRAM(s) Oral <User Schedule>  senna 2 Tablet(s) Oral at bedtime  sodium chloride 2% . 1000 milliLiter(s) IV Continuous <Continuous>    ----------------------------------------------------------------------------------------  T(C): 37.2 (19 @ 11:45), Max: 37.3 (19 @ 07:39)  HR: 91 (19 @ 11:45) (91 - 98)  BP: 130/81 (19 @ 11:45) (111/69 - 133/85)  RR: 18 (19 @ 11:45) (18 - 18)  SpO2: 96% (19 @ 11:45) (96% - 97%)    PHYSICAL EXAMINATION  Constitutional - NAD, Comfortable  HEENT - small cranial incision with staples c/d/i, EOMI  Chest - Breathing comfortably, No wheezing  Cardiovascular - S1S2   Abdomen - Soft   Extremities - No C/C/E, No calf tenderness   Neurologic Exam -                    Cognitive - Awake, Alert, Oriented to self, place, date     Communication - Fluent, No dysarthria; naming and repetition intact     Cranial Nerves - CN 2-12 intact     Tone - Normal     Motor -                     LEFT    UE - ShAB 4/5, EF 4/5, EE 4/5, WE 5/5,  5/5                    LEFT    LE - HF 5/5, KE 5/5, DF 5/5, PF 5/5                    RIGHT UE - ShAB 5/5, EF 5/5, EE 5/5, WE 5/5,  5/5                    RIGHT LE - HF 5/5, KE 5/5, DF 5/5, PF 5/5        Sensory - Intact to LT B/L     Reflexes - DTR Intact, No primitive reflexes  Psychiatric - Mood stable, Affect WNL

## 2019-01-12 LAB
ANION GAP SERPL CALC-SCNC: 14 MMOL/L — SIGNIFICANT CHANGE UP (ref 5–17)
ANION GAP SERPL CALC-SCNC: 14 MMOL/L — SIGNIFICANT CHANGE UP (ref 5–17)
BUN SERPL-MCNC: 13 MG/DL — SIGNIFICANT CHANGE UP (ref 7–23)
BUN SERPL-MCNC: 14 MG/DL — SIGNIFICANT CHANGE UP (ref 7–23)
CALCIUM SERPL-MCNC: 9.2 MG/DL — SIGNIFICANT CHANGE UP (ref 8.4–10.5)
CALCIUM SERPL-MCNC: 9.2 MG/DL — SIGNIFICANT CHANGE UP (ref 8.4–10.5)
CHLORIDE SERPL-SCNC: 94 MMOL/L — LOW (ref 96–108)
CHLORIDE SERPL-SCNC: 98 MMOL/L — SIGNIFICANT CHANGE UP (ref 96–108)
CO2 SERPL-SCNC: 24 MMOL/L — SIGNIFICANT CHANGE UP (ref 22–31)
CO2 SERPL-SCNC: 25 MMOL/L — SIGNIFICANT CHANGE UP (ref 22–31)
CREAT SERPL-MCNC: 0.65 MG/DL — SIGNIFICANT CHANGE UP (ref 0.5–1.3)
CREAT SERPL-MCNC: 0.65 MG/DL — SIGNIFICANT CHANGE UP (ref 0.5–1.3)
GLUCOSE SERPL-MCNC: 160 MG/DL — HIGH (ref 70–99)
GLUCOSE SERPL-MCNC: 164 MG/DL — HIGH (ref 70–99)
POTASSIUM SERPL-MCNC: 3.7 MMOL/L — SIGNIFICANT CHANGE UP (ref 3.5–5.3)
POTASSIUM SERPL-MCNC: 4.2 MMOL/L — SIGNIFICANT CHANGE UP (ref 3.5–5.3)
POTASSIUM SERPL-SCNC: 3.7 MMOL/L — SIGNIFICANT CHANGE UP (ref 3.5–5.3)
POTASSIUM SERPL-SCNC: 4.2 MMOL/L — SIGNIFICANT CHANGE UP (ref 3.5–5.3)
SODIUM SERPL-SCNC: 133 MMOL/L — LOW (ref 135–145)
SODIUM SERPL-SCNC: 136 MMOL/L — SIGNIFICANT CHANGE UP (ref 135–145)

## 2019-01-12 PROCEDURE — 99232 SBSQ HOSP IP/OBS MODERATE 35: CPT

## 2019-01-12 RX ORDER — SODIUM CHLORIDE 9 MG/ML
2 INJECTION INTRAMUSCULAR; INTRAVENOUS; SUBCUTANEOUS EVERY 8 HOURS
Qty: 0 | Refills: 0 | Status: DISCONTINUED | OUTPATIENT
Start: 2019-01-12 | End: 2019-01-13

## 2019-01-12 RX ORDER — ENOXAPARIN SODIUM 100 MG/ML
40 INJECTION SUBCUTANEOUS
Qty: 0 | Refills: 0 | Status: DISCONTINUED | OUTPATIENT
Start: 2019-01-12 | End: 2019-01-16

## 2019-01-12 RX ORDER — POTASSIUM CHLORIDE 20 MEQ
20 PACKET (EA) ORAL
Qty: 0 | Refills: 0 | Status: COMPLETED | OUTPATIENT
Start: 2019-01-12 | End: 2019-01-12

## 2019-01-12 RX ADMIN — SODIUM CHLORIDE 2 GRAM(S): 9 INJECTION INTRAMUSCULAR; INTRAVENOUS; SUBCUTANEOUS at 14:21

## 2019-01-12 RX ADMIN — Medication 1 PATCH: at 18:44

## 2019-01-12 RX ADMIN — Medication 20 MILLIEQUIVALENT(S): at 14:21

## 2019-01-12 RX ADMIN — Medication 100 MILLIGRAM(S): at 22:09

## 2019-01-12 RX ADMIN — DIVALPROEX SODIUM 250 MILLIGRAM(S): 500 TABLET, DELAYED RELEASE ORAL at 22:09

## 2019-01-12 RX ADMIN — Medication 100 MILLIGRAM(S): at 05:24

## 2019-01-12 RX ADMIN — SODIUM CHLORIDE 2 GRAM(S): 9 INJECTION INTRAMUSCULAR; INTRAVENOUS; SUBCUTANEOUS at 22:09

## 2019-01-12 RX ADMIN — Medication 1 PATCH: at 11:48

## 2019-01-12 RX ADMIN — Medication 1 TABLET(S): at 11:48

## 2019-01-12 RX ADMIN — Medication 1 MILLIGRAM(S): at 11:48

## 2019-01-12 RX ADMIN — ENOXAPARIN SODIUM 40 MILLIGRAM(S): 100 INJECTION SUBCUTANEOUS at 17:43

## 2019-01-12 RX ADMIN — SENNA PLUS 2 TABLET(S): 8.6 TABLET ORAL at 22:09

## 2019-01-12 RX ADMIN — Medication 100 MILLIGRAM(S): at 14:21

## 2019-01-12 RX ADMIN — SODIUM CHLORIDE 2 GRAM(S): 9 INJECTION INTRAMUSCULAR; INTRAVENOUS; SUBCUTANEOUS at 11:48

## 2019-01-12 RX ADMIN — Medication 20 MILLIEQUIVALENT(S): at 11:48

## 2019-01-12 RX ADMIN — Medication 1 PATCH: at 11:44

## 2019-01-12 RX ADMIN — AMLODIPINE BESYLATE 10 MILLIGRAM(S): 2.5 TABLET ORAL at 05:24

## 2019-01-12 RX ADMIN — Medication 8 MILLIGRAM(S): at 22:09

## 2019-01-12 RX ADMIN — Medication 1 PATCH: at 07:00

## 2019-01-12 RX ADMIN — Medication 1 EACH: at 05:24

## 2019-01-12 RX ADMIN — Medication 500000 UNIT(S): at 05:24

## 2019-01-12 NOTE — PROGRESS NOTE ADULT - PROBLEM SELECTOR PLAN 2
stable 133  continue salt tabs w/improved Na  added 1.2L fluid restriction  - continue monitoring Na

## 2019-01-12 NOTE — PROGRESS NOTE ADULT - SUBJECTIVE AND OBJECTIVE BOX
Patient is a 63y old  Male who presents with a chief complaint of Patient was admitted with traumatic brain injury including right frontal IPH , bilateral subdural hematoma and traumatic subarachnoid hemorrhage. (12 Jan 2019 09:38)  Subjective: No acute events overnight        VITAL SIGNS:  Vital Signs Last 24 Hrs  T(C): 36.8 (12 Jan 2019 12:25), Max: 37.4 (11 Jan 2019 15:17)  T(F): 98.3 (12 Jan 2019 12:25), Max: 99.3 (11 Jan 2019 15:17)  HR: 97 (12 Jan 2019 12:25) (90 - 99)  BP: 129/80 (12 Jan 2019 12:25) (112/70 - 131/83)  BP(mean): --  RR: 16 (12 Jan 2019 12:25) (16 - 19)  SpO2: 96% (12 Jan 2019 12:25) (92% - 96%)      PHYSICAL EXAM:     GENERAL: no acute distress  HEENT: PERRLA, EOMI, moist oropharynx   RESPIRATORY: CTAB, no w/c   CARDIOVASCULAR: RRR, no murmurs, gallops, rubs  ABDOMINAL: soft, non-tender, non-distended, positive bowel sounds   EXTREMITIES: no clubbing, cyanosis, or edema  NEUROLOGICAL: alert and oriented x 3, non-focal  SKIN: no rashes or lesions   MUSCULOSKELETAL: no gross joint deformity      01-12    133<L>  |  94<L>  |  14  ----------------------------<  160<H>  3.7   |  25  |  0.65    Ca    9.2      12 Jan 2019 05:55        CAPILLARY BLOOD GLUCOSE          MEDICATIONS  (STANDING):  amLODIPine   Tablet 10 milliGRAM(s) Oral daily  diVALproex  milliGRAM(s) Oral <User Schedule>  docusate sodium 100 milliGRAM(s) Oral three times a day  doxazosin 8 milliGRAM(s) Oral at bedtime  enoxaparin Injectable 40 milliGRAM(s) SubCutaneous <User Schedule>  folic acid 1 milliGRAM(s) Oral daily  influenza   Vaccine 0.5 milliLiter(s) IntraMuscular once  multivitamin 1 Tablet(s) Oral daily  nicotine - 21 mG/24Hr(s) Patch 1 patch Transdermal daily  nicotine - Inhaler 1 Each Inhalation two times a day  potassium chloride    Tablet ER 20 milliEquivalent(s) Oral every 2 hours  senna 2 Tablet(s) Oral at bedtime  sodium chloride 2 Gram(s) Oral every 8 hours    MEDICATIONS  (PRN):  acetaminophen   Tablet .. 650 milliGRAM(s) Oral every 6 hours PRN Temp greater or equal to 38C (100.4F), Mild Pain (1 - 3) Patient is a 63y old  Male who presents with a chief complaint of Patient was admitted with traumatic brain injury including right frontal IPH , bilateral subdural hematoma and traumatic subarachnoid hemorrhage. (12 Jan 2019 09:38)  Subjective: No acute events overnight. Pt reports pain 'all over' this morning- bilateral hands, feet, legs, shoulder, head, neck. No fevers/chills, no cough. No other complaints.     VITAL SIGNS:  Vital Signs Last 24 Hrs  T(C): 36.8 (12 Jan 2019 12:25), Max: 37.4 (11 Jan 2019 15:17)  T(F): 98.3 (12 Jan 2019 12:25), Max: 99.3 (11 Jan 2019 15:17)  HR: 97 (12 Jan 2019 12:25) (90 - 99)  BP: 129/80 (12 Jan 2019 12:25) (112/70 - 131/83)  BP(mean): --  RR: 16 (12 Jan 2019 12:25) (16 - 19)  SpO2: 96% (12 Jan 2019 12:25) (92% - 96%)    PHYSICAL EXAM:     GENERAL: no acute distress  HEENT: PERRLA, EOMI, moist oropharynx, +scalp staples  RESPIRATORY: CTAB, no w/c   CARDIOVASCULAR: RRR, no murmurs, gallops, rubs  ABDOMINAL: soft, non-tender, non-distended, positive bowel sounds   EXTREMITIES: no clubbing, cyanosis, or edema  NEUROLOGICAL: alert and oriented x 3, non-focal  SKIN: no rashes or lesions   MUSCULOSKELETAL: no gross joint deformity      01-12    133<L>  |  94<L>  |  14  ----------------------------<  160<H>  3.7   |  25  |  0.65    Ca    9.2      12 Jan 2019 05:55        CAPILLARY BLOOD GLUCOSE          MEDICATIONS  (STANDING):  amLODIPine   Tablet 10 milliGRAM(s) Oral daily  diVALproex  milliGRAM(s) Oral <User Schedule>  docusate sodium 100 milliGRAM(s) Oral three times a day  doxazosin 8 milliGRAM(s) Oral at bedtime  enoxaparin Injectable 40 milliGRAM(s) SubCutaneous <User Schedule>  folic acid 1 milliGRAM(s) Oral daily  influenza   Vaccine 0.5 milliLiter(s) IntraMuscular once  multivitamin 1 Tablet(s) Oral daily  nicotine - 21 mG/24Hr(s) Patch 1 patch Transdermal daily  nicotine - Inhaler 1 Each Inhalation two times a day  potassium chloride    Tablet ER 20 milliEquivalent(s) Oral every 2 hours  senna 2 Tablet(s) Oral at bedtime  sodium chloride 2 Gram(s) Oral every 8 hours    MEDICATIONS  (PRN):  acetaminophen   Tablet .. 650 milliGRAM(s) Oral every 6 hours PRN Temp greater or equal to 38C (100.4F), Mild Pain (1 - 3)

## 2019-01-12 NOTE — PROGRESS NOTE ADULT - ASSESSMENT
HPI:  63 year-old man with history of alcohol abuse was found wandering around attempting to enter a locked industrial facility on 1/4/18 and was taken to [facility] when he was noted to be confused. He was reported to be agitated, oriented only to self, no following commands but briskly moving all limbs. Imaging revealed multifocal contusions and subarachnoid hemorrhage. CTA revealed possible AVM so he was transferred to [facility] for further management. Of note, he received 8mg of lorazepam at OSH. (04 Jan 2019 17:42)  Patient admitted with traumtic IPH/ subdural and subarachnoid hematoma after fall .   PROCEDURE: ICP (intracranial pressure) monitoring     POD#    PLAN:  1 Out of bed   2 continue PT/OT   3 dc depakote and VALPROIC ACID-mood stable   4 amlodipine for blood pressure control   5 soft diet   6 hyponatremia-start salt tabs-f/u tonight and continue free water restriction   7 hypokalemia-supplement k   8 dvt ppx sq lovenox and scds   9 continue cardura to prevent urinary retention   10 stool softeners   11 dispo: PT  to optimize for discharge to home.   Assessment:  Please Check When Present   []  GCS  E   V  M     Heart Failure: []Acute, [] acute on chronic , []chronic  Heart Failure:  [] Diastolic (HFpEF), [] Systolic (HFrEF), []Combined (HFpEF and HFrEF), [] RHF, [] Pulm HTN, [] Other    [] SAMUEL, [] ATN, [] AIN, [] other  [] CKD1, [] CKD2, [] CKD 3, [] CKD 4, [] CKD 5, []ESRD    Encephalopathy: [] Metabolic, [] Hepatic, [] toxic, [] Neurological, [] Other    Abnormal Nurtitional Status: [] malnurtition (see nutrition note), [ ]underweight: BMI < 19, [] morbid obesity: BMI >40, [] Cachexia    [] Sepsis  [] hypovolemic shock,[] cardiogenic shock, [] hemorrhagic shock, [] neuogenic shock  [] Acute Respiratory Failure  []Cerebral edema, [] Brain compression/ herniation,   [] Functional quadriplegia  [] Acute blood loss anemia HPI:  63 year-old man with history of alcohol abuse was found wandering around attempting to enter a locked industrial facility on 1/4/18 and was taken to [facility] when he was noted to be confused. He was reported to be agitated, oriented only to self, no following commands but briskly moving all limbs. Imaging revealed multifocal contusions and subarachnoid hemorrhage. CTA revealed possible AVM so he was transferred to [facility] for further management. Of note, he received 8mg of lorazepam at OSH. (04 Jan 2019 17:42)  Patient admitted with traumtic IPH/ subdural and subarachnoid hematoma after fall .   PROCEDURE: ICP (intracranial pressure) monitoring     POD#    PLAN:  1 Out of bed   2 continue PT/OT   3 dc depakote and VALPROIC ACID-mood stable   4 amlodipine for blood pressure control   5 soft diet   6 hyponatremia-start salt tabs-f/u tonight and continue free water restriction   7 hypokalemia-supplement k   8 dvt ppx sq lovenox and scds   9 continue cardura to prevent urinary retention   10 stool softeners   11 dispo: PT  to optimize for discharge to home.   Assessment:  Please Check When Present   []  GCS  E   V  M     Heart Failure: []Acute, [] acute on chronic , []chronic  Heart Failure:  [] Diastolic (HFpEF), [] Systolic (HFrEF), []Combined (HFpEF and HFrEF), [] RHF, [] Pulm HTN, [] Other    [] SAMUEL, [] ATN, [] AIN, [] other  [] CKD1, [] CKD2, [] CKD 3, [] CKD 4, [] CKD 5, []ESRD    Encephalopathy: [] Metabolic, [] Hepatic, [] toxic, [] Neurological, [] Other    Abnormal Nurtitional Status: [] malnurtition (see nutrition note), [ ]underweight: BMI < 19, [] morbid obesity: BMI >40, [] Cachexia    [] Sepsis  [] hypovolemic shock,[] cardiogenic shock, [] hemorrhagic shock, [] neuogenic shock  [] Acute Respiratory Failure  []Cerebral edema, [] Brain compression/ herniation,   [] Functional quadriplegia  [] Acute blood loss anemia  More than 40 minutes were spent educating the patient and family regarding condition, medications, follow up plans, signs and symptoms to be concerned with, preparing paperwork, and questions answered regarding discharge.

## 2019-01-12 NOTE — PROGRESS NOTE ADULT - SUBJECTIVE AND OBJECTIVE BOX
SUBJECTIVE: Patient was seen and evaluated at bedside. Patient is resting in bed and is in no new acute distress.   OVERNIGHT EVENTS:  none     Vital Signs Last 24 Hrs  T(C): 37.3 (12 Jan 2019 08:25), Max: 37.4 (11 Jan 2019 15:17)  T(F): 99.2 (12 Jan 2019 08:25), Max: 99.3 (11 Jan 2019 15:17)  HR: 95 (12 Jan 2019 08:25) (90 - 99)  BP: 131/83 (12 Jan 2019 08:25) (112/70 - 131/83)  BP(mean): --  RR: 19 (12 Jan 2019 08:25) (18 - 19)  SpO2: 95% (12 Jan 2019 08:25) (92% - 96%)    PHYSICAL EXAM:    General: No Acute Distress     Neurological: Awake, alert oriented to person, place and time, Following Commands, PERRL, EOMI, Face Symmetrical, Speech Fluent, Moving all extremities, Muscle Strength -left upper 4+/5, No Drift, Sensation to Light Touch Intact    Pulmonary: Clear to Auscultation, No Rales, No Rhonchi, No Wheezes     Cardiovascular: S1, S2, Regular Rate and Rhythm     Gastrointestinal: Soft, Nontender, Nondistended     Incision:     LABS:   01-12    133<L>  |  94<L>  |  14  ----------------------------<  160<H>  3.7   |  25  |  0.65    Ca    9.2      12 Jan 2019 05:55      Hemoglobin A1C, Whole Blood: 5.9 % (01-04 @ 23:31)      01-11 @ 07:01  -  01-12 @ 07:00  --------------------------------------------------------  IN: 590 mL / OUT: 200 mL / NET: 390 mL      DRAINS:     MEDICATIONS:  Antibiotics:    Neuro:  acetaminophen   Tablet .. 650 milliGRAM(s) Oral every 6 hours PRN Temp greater or equal to 38C (100.4F), Mild Pain (1 - 3)  diVALproex  milliGRAM(s) Oral <User Schedule>    Cardiac:  amLODIPine   Tablet 10 milliGRAM(s) Oral daily  doxazosin 8 milliGRAM(s) Oral at bedtime    Pulm:    GI/:  docusate sodium 100 milliGRAM(s) Oral three times a day  senna 2 Tablet(s) Oral at bedtime    Other:   enoxaparin Injectable 40 milliGRAM(s) SubCutaneous <User Schedule>  folic acid 1 milliGRAM(s) Oral daily  influenza   Vaccine 0.5 milliLiter(s) IntraMuscular once  multivitamin 1 Tablet(s) Oral daily  nicotine - 21 mG/24Hr(s) Patch 1 patch Transdermal daily  nicotine - Inhaler 1 Each Inhalation two times a day  potassium chloride    Tablet ER 20 milliEquivalent(s) Oral every 2 hours  sodium chloride 2 Gram(s) Oral every 8 hours    DIET: [] Regular [] CCD [] Renal [] Puree [] Dysphagia [] Tube Feeds: soft     IMAGING:   no new imaging today

## 2019-01-13 DIAGNOSIS — Z29.9 ENCOUNTER FOR PROPHYLACTIC MEASURES, UNSPECIFIED: ICD-10-CM

## 2019-01-13 LAB
ANION GAP SERPL CALC-SCNC: 12 MMOL/L — SIGNIFICANT CHANGE UP (ref 5–17)
ANION GAP SERPL CALC-SCNC: 13 MMOL/L — SIGNIFICANT CHANGE UP (ref 5–17)
BUN SERPL-MCNC: 11 MG/DL — SIGNIFICANT CHANGE UP (ref 7–23)
BUN SERPL-MCNC: 12 MG/DL — SIGNIFICANT CHANGE UP (ref 7–23)
CALCIUM SERPL-MCNC: 9.6 MG/DL — SIGNIFICANT CHANGE UP (ref 8.4–10.5)
CALCIUM SERPL-MCNC: 9.7 MG/DL — SIGNIFICANT CHANGE UP (ref 8.4–10.5)
CHLORIDE SERPL-SCNC: 95 MMOL/L — LOW (ref 96–108)
CHLORIDE SERPL-SCNC: 96 MMOL/L — SIGNIFICANT CHANGE UP (ref 96–108)
CO2 SERPL-SCNC: 24 MMOL/L — SIGNIFICANT CHANGE UP (ref 22–31)
CO2 SERPL-SCNC: 26 MMOL/L — SIGNIFICANT CHANGE UP (ref 22–31)
CREAT SERPL-MCNC: 0.58 MG/DL — SIGNIFICANT CHANGE UP (ref 0.5–1.3)
CREAT SERPL-MCNC: 0.69 MG/DL — SIGNIFICANT CHANGE UP (ref 0.5–1.3)
GLUCOSE SERPL-MCNC: 141 MG/DL — HIGH (ref 70–99)
GLUCOSE SERPL-MCNC: 149 MG/DL — HIGH (ref 70–99)
POTASSIUM SERPL-MCNC: 4 MMOL/L — SIGNIFICANT CHANGE UP (ref 3.5–5.3)
POTASSIUM SERPL-MCNC: 4.7 MMOL/L — SIGNIFICANT CHANGE UP (ref 3.5–5.3)
POTASSIUM SERPL-SCNC: 4 MMOL/L — SIGNIFICANT CHANGE UP (ref 3.5–5.3)
POTASSIUM SERPL-SCNC: 4.7 MMOL/L — SIGNIFICANT CHANGE UP (ref 3.5–5.3)
SODIUM SERPL-SCNC: 131 MMOL/L — LOW (ref 135–145)
SODIUM SERPL-SCNC: 135 MMOL/L — SIGNIFICANT CHANGE UP (ref 135–145)

## 2019-01-13 PROCEDURE — 99232 SBSQ HOSP IP/OBS MODERATE 35: CPT

## 2019-01-13 RX ORDER — SODIUM CHLORIDE 9 MG/ML
3 INJECTION INTRAMUSCULAR; INTRAVENOUS; SUBCUTANEOUS EVERY 8 HOURS
Qty: 0 | Refills: 0 | Status: DISCONTINUED | OUTPATIENT
Start: 2019-01-13 | End: 2019-01-16

## 2019-01-13 RX ORDER — DIPHENHYDRAMINE HCL 50 MG
25 CAPSULE ORAL DAILY
Qty: 0 | Refills: 0 | Status: DISCONTINUED | OUTPATIENT
Start: 2019-01-13 | End: 2019-01-16

## 2019-01-13 RX ORDER — HALOPERIDOL DECANOATE 100 MG/ML
0.5 INJECTION INTRAMUSCULAR ONCE
Qty: 0 | Refills: 0 | Status: DISCONTINUED | OUTPATIENT
Start: 2019-01-13 | End: 2019-01-16

## 2019-01-13 RX ADMIN — Medication 1 TABLET(S): at 13:31

## 2019-01-13 RX ADMIN — ENOXAPARIN SODIUM 40 MILLIGRAM(S): 100 INJECTION SUBCUTANEOUS at 17:39

## 2019-01-13 RX ADMIN — Medication 100 MILLIGRAM(S): at 13:31

## 2019-01-13 RX ADMIN — DIVALPROEX SODIUM 250 MILLIGRAM(S): 500 TABLET, DELAYED RELEASE ORAL at 22:18

## 2019-01-13 RX ADMIN — SODIUM CHLORIDE 3 GRAM(S): 9 INJECTION INTRAMUSCULAR; INTRAVENOUS; SUBCUTANEOUS at 22:18

## 2019-01-13 RX ADMIN — SENNA PLUS 2 TABLET(S): 8.6 TABLET ORAL at 22:18

## 2019-01-13 RX ADMIN — Medication 1 PATCH: at 12:43

## 2019-01-13 RX ADMIN — Medication 1 PATCH: at 20:00

## 2019-01-13 RX ADMIN — Medication 1 PATCH: at 07:16

## 2019-01-13 RX ADMIN — Medication 8 MILLIGRAM(S): at 22:18

## 2019-01-13 RX ADMIN — AMLODIPINE BESYLATE 10 MILLIGRAM(S): 2.5 TABLET ORAL at 05:09

## 2019-01-13 RX ADMIN — SODIUM CHLORIDE 2 GRAM(S): 9 INJECTION INTRAMUSCULAR; INTRAVENOUS; SUBCUTANEOUS at 05:09

## 2019-01-13 RX ADMIN — Medication 25 MILLIGRAM(S): at 00:54

## 2019-01-13 RX ADMIN — Medication 1 EACH: at 05:10

## 2019-01-13 RX ADMIN — Medication 1 PATCH: at 12:42

## 2019-01-13 RX ADMIN — Medication 100 MILLIGRAM(S): at 22:20

## 2019-01-13 RX ADMIN — Medication 100 MILLIGRAM(S): at 05:10

## 2019-01-13 RX ADMIN — SODIUM CHLORIDE 3 GRAM(S): 9 INJECTION INTRAMUSCULAR; INTRAVENOUS; SUBCUTANEOUS at 13:31

## 2019-01-13 RX ADMIN — Medication 1 MILLIGRAM(S): at 13:31

## 2019-01-13 RX ADMIN — Medication 1 EACH: at 17:39

## 2019-01-13 NOTE — PROGRESS NOTE ADULT - SUBJECTIVE AND OBJECTIVE BOX
Day 8 s/p head trauma  admitted with Rt frontal IPH, Gopal SDH, SAH    ICP (intracranial pressure) monitoring    Overnight event: patient was agitated overnight got Haldol was noted to be sleepy today     Vital Signs Last 24 Hrs  T(C): 36.8 (13 Jan 2019 12:17), Max: 37.3 (12 Jan 2019 21:09)  T(F): 98.2 (13 Jan 2019 12:17), Max: 99.1 (12 Jan 2019 21:09)  HR: 76 (13 Jan 2019 12:17) (76 - 95)  BP: 147/82 (13 Jan 2019 12:17) (121/71 - 147/82)  BP(mean): --  RR: 16 (13 Jan 2019 12:17) (16 - 20)  SpO2: 97% (13 Jan 2019 12:17) (94% - 98%)     01-13    131<L>  |  95<L>  |  11  ----------------------------<  149<H>  4.0   |  24  |  0.58    Ca    9.6      13 Jan 2019 06:04       Stroke Core Measures    Hemoglobin A1C, Whole Blood: 5.9 % (01-04 @ 23:31)    DRAIN OUTPUT:     NEUROIMAGING:     PHYSICAL EXAM:    General: No Acute Distress     Neurological: Sleepy but  arousable,  oriented to person, place and time, Following Commands, PERRL, EOMI, Face Symmetrical, Speech Fluent, Moving all extremities, Muscle Strength normal in all four extremities, unable to assess for any Drift, Sensation to Light Touch Intact    Pulmonary: Clear to Auscultation, No Rales, No Rhonchi, No Wheezes     Cardiovascular: S1, S2, Regular Rate and Rhythm     Gastrointestinal: Soft, Nontender, Nondistended     Incision:     MEDICATIONS:   Antibiotics:    Neuro:  acetaminophen   Tablet .. 650 milliGRAM(s) Oral every 6 hours PRN Temp greater or equal to 38C (100.4F), Mild Pain (1 - 3)  diphenhydrAMINE 25 milliGRAM(s) Oral daily PRN Insomnia  diVALproex  milliGRAM(s) Oral <User Schedule>  haloperidol    Injectable 0.5 milliGRAM(s) IV Push once PRN agitaciones    Anticoagulation:  enoxaparin Injectable 40 milliGRAM(s) SubCutaneous <User Schedule>    Cardiology:  amLODIPine   Tablet 10 milliGRAM(s) Oral daily  doxazosin 8 milliGRAM(s) Oral at bedtime    Endo:     Pulm:    GI/:  docusate sodium 100 milliGRAM(s) Oral three times a day  senna 2 Tablet(s) Oral at bedtime    Other:  folic acid 1 milliGRAM(s) Oral daily  influenza   Vaccine 0.5 milliLiter(s) IntraMuscular once  multivitamin 1 Tablet(s) Oral daily  nicotine - 21 mG/24Hr(s) Patch 1 patch Transdermal daily  nicotine - Inhaler 1 Each Inhalation two times a day  sodium chloride 2 Gram(s) Oral every 8 hours

## 2019-01-13 NOTE — PROGRESS NOTE ADULT - ASSESSMENT
Please Check When Present   [x]  GCS  E4   V5  M6     Heart Failure: []Acute, [] acute on chronic , []chronic  Heart Failure:  [] Diastolic (HFpEF), [] Systolic (HFrEF), []Combined (HFpEF and HFrEF), [] RHF, [] Pulm HTN, [] Other    [] SAMUEL, [] ATN, [] AIN, [] other  [] CKD1, [] CKD2, [] CKD 3, [] CKD 4, [] CKD 5, []ESRD    Encephalopathy: [] Metabolic, [] Hepatic, [] toxic, [] Neurological, [] Other    Abnormal Nurtitional Status: [] malnurtition (see nutrition note), [ ]underweight: BMI < 19, [] morbid obesity: BMI >40, [] Cachexia    [] Sepsis  [] hypovolemic shock,[] cardiogenic shock, [] hemorrhagic shock, [] neuogenic shock  [] Acute Respiratory Failure  []Cerebral edema, [] Brain compression/ herniation,   [] Functional quadriplegia  [] Acute blood loss anemia

## 2019-01-13 NOTE — PROGRESS NOTE ADULT - PROBLEM SELECTOR PLAN 2
Fluctuating btw 131-134  - increase salt tabs to 3gm tid  - added 1.2L fluid restriction  - continue monitoring Na

## 2019-01-13 NOTE — PROGRESS NOTE ADULT - PROBLEM SELECTOR PLAN 2
Will increase salt tabs to 3gm tid  Will added 1.2L fluid restriction  Will continue monitoring Na. Will increase salt tabs to 3gm tid  Will added 1.2L fluid restriction  Will continue monitoring Na. q 12hrs

## 2019-01-13 NOTE — PROGRESS NOTE ADULT - ASSESSMENT
63M w/alcohol abuse has traumatic SDH SAH brain contusion s/p ICP bolt, intubated, now stable on neurosurgical floor, with daytime somnolence

## 2019-01-13 NOTE — PROGRESS NOTE ADULT - SUBJECTIVE AND OBJECTIVE BOX
Hospitalist- Tomás Dupont MD  Pager: [phone]  If no response or off-hours, page [phone]  -------------------------------------  Patient is a 63y old  Male who presents with a chief complaint of AMS, [facility] (12 Jan 2019 14:00)  Subjective: Pt reportedly awake all night, pacing the halls, with some agitation, received haldol. This morning, patient very sleepy- arousable to tactile/painful stimuli and briefly answers yes/no questions, but repeatedly falls asleep. Full ROS unable to be obtained.       VITAL SIGNS:  Vital Signs Last 24 Hrs  T(C): 36.6 (13 Jan 2019 08:07), Max: 37.3 (12 Jan 2019 21:09)  T(F): 97.9 (13 Jan 2019 08:07), Max: 99.1 (12 Jan 2019 21:09)  HR: 84 (13 Jan 2019 08:07) (83 - 97)  BP: 121/71 (13 Jan 2019 08:07) (121/71 - 132/73)  BP(mean): --  RR: 17 (13 Jan 2019 08:07) (16 - 20)  SpO2: 97% (13 Jan 2019 08:07) (94% - 98%)      PHYSICAL EXAM:     GENERAL: no acute distress, sleepy but arousable  HEENT: PERRLA, EOMI, moist oropharynx, +scalp staples  RESPIRATORY: CTAB, no w/c   CARDIOVASCULAR: RRR, no murmurs, gallops, rubs  ABDOMINAL: soft, non-tender, non-distended, positive bowel sounds   EXTREMITIES: no clubbing, cyanosis, or edema  NEUROLOGICAL: unable to fully assess due to sleepiness  SKIN: no rashes or lesions   MUSCULOSKELETAL: no gross joint deformity      01-13    131<L>  |  95<L>  |  11  ----------------------------<  149<H>  4.0   |  24  |  0.58    Ca    9.6      13 Jan 2019 06:04    CAPILLARY BLOOD GLUCOSE    MEDICATIONS  (STANDING):  amLODIPine   Tablet 10 milliGRAM(s) Oral daily  diVALproex  milliGRAM(s) Oral <User Schedule>  docusate sodium 100 milliGRAM(s) Oral three times a day  doxazosin 8 milliGRAM(s) Oral at bedtime  enoxaparin Injectable 40 milliGRAM(s) SubCutaneous <User Schedule>  folic acid 1 milliGRAM(s) Oral daily  influenza   Vaccine 0.5 milliLiter(s) IntraMuscular once  multivitamin 1 Tablet(s) Oral daily  nicotine - 21 mG/24Hr(s) Patch 1 patch Transdermal daily  nicotine - Inhaler 1 Each Inhalation two times a day  senna 2 Tablet(s) Oral at bedtime  sodium chloride 2 Gram(s) Oral every 8 hours    MEDICATIONS  (PRN):  acetaminophen   Tablet .. 650 milliGRAM(s) Oral every 6 hours PRN Temp greater or equal to 38C (100.4F), Mild Pain (1 - 3)  diphenhydrAMINE 25 milliGRAM(s) Oral daily PRN Insomnia  haloperidol    Injectable 0.5 milliGRAM(s) IV Push once PRN agitaciones

## 2019-01-14 LAB
ANION GAP SERPL CALC-SCNC: 12 MMOL/L — SIGNIFICANT CHANGE UP (ref 5–17)
ANION GAP SERPL CALC-SCNC: 13 MMOL/L — SIGNIFICANT CHANGE UP (ref 5–17)
BUN SERPL-MCNC: 15 MG/DL — SIGNIFICANT CHANGE UP (ref 7–23)
BUN SERPL-MCNC: 16 MG/DL — SIGNIFICANT CHANGE UP (ref 7–23)
CALCIUM SERPL-MCNC: 9.4 MG/DL — SIGNIFICANT CHANGE UP (ref 8.4–10.5)
CALCIUM SERPL-MCNC: 9.8 MG/DL — SIGNIFICANT CHANGE UP (ref 8.4–10.5)
CHLORIDE SERPL-SCNC: 95 MMOL/L — LOW (ref 96–108)
CHLORIDE SERPL-SCNC: 96 MMOL/L — SIGNIFICANT CHANGE UP (ref 96–108)
CO2 SERPL-SCNC: 24 MMOL/L — SIGNIFICANT CHANGE UP (ref 22–31)
CO2 SERPL-SCNC: 25 MMOL/L — SIGNIFICANT CHANGE UP (ref 22–31)
CREAT SERPL-MCNC: 0.64 MG/DL — SIGNIFICANT CHANGE UP (ref 0.5–1.3)
CREAT SERPL-MCNC: 0.69 MG/DL — SIGNIFICANT CHANGE UP (ref 0.5–1.3)
CULTURE RESULTS: SIGNIFICANT CHANGE UP
CULTURE RESULTS: SIGNIFICANT CHANGE UP
GLUCOSE SERPL-MCNC: 117 MG/DL — HIGH (ref 70–99)
GLUCOSE SERPL-MCNC: 160 MG/DL — HIGH (ref 70–99)
POTASSIUM SERPL-MCNC: 4.3 MMOL/L — SIGNIFICANT CHANGE UP (ref 3.5–5.3)
POTASSIUM SERPL-MCNC: 4.3 MMOL/L — SIGNIFICANT CHANGE UP (ref 3.5–5.3)
POTASSIUM SERPL-SCNC: 4.3 MMOL/L — SIGNIFICANT CHANGE UP (ref 3.5–5.3)
POTASSIUM SERPL-SCNC: 4.3 MMOL/L — SIGNIFICANT CHANGE UP (ref 3.5–5.3)
SODIUM SERPL-SCNC: 132 MMOL/L — LOW (ref 135–145)
SODIUM SERPL-SCNC: 133 MMOL/L — LOW (ref 135–145)
SPECIMEN SOURCE: SIGNIFICANT CHANGE UP
SPECIMEN SOURCE: SIGNIFICANT CHANGE UP

## 2019-01-14 PROCEDURE — 99232 SBSQ HOSP IP/OBS MODERATE 35: CPT

## 2019-01-14 RX ADMIN — Medication 650 MILLIGRAM(S): at 19:39

## 2019-01-14 RX ADMIN — Medication 1 MILLIGRAM(S): at 12:07

## 2019-01-14 RX ADMIN — Medication 1 PATCH: at 12:07

## 2019-01-14 RX ADMIN — SENNA PLUS 2 TABLET(S): 8.6 TABLET ORAL at 21:01

## 2019-01-14 RX ADMIN — Medication 650 MILLIGRAM(S): at 20:00

## 2019-01-14 RX ADMIN — Medication 1 EACH: at 06:16

## 2019-01-14 RX ADMIN — Medication 1 TABLET(S): at 12:07

## 2019-01-14 RX ADMIN — Medication 100 MILLIGRAM(S): at 21:01

## 2019-01-14 RX ADMIN — Medication 8 MILLIGRAM(S): at 21:01

## 2019-01-14 RX ADMIN — ENOXAPARIN SODIUM 40 MILLIGRAM(S): 100 INJECTION SUBCUTANEOUS at 17:18

## 2019-01-14 RX ADMIN — Medication 1 EACH: at 17:18

## 2019-01-14 RX ADMIN — Medication 25 MILLIGRAM(S): at 23:47

## 2019-01-14 RX ADMIN — SODIUM CHLORIDE 3 GRAM(S): 9 INJECTION INTRAMUSCULAR; INTRAVENOUS; SUBCUTANEOUS at 21:01

## 2019-01-14 RX ADMIN — AMLODIPINE BESYLATE 10 MILLIGRAM(S): 2.5 TABLET ORAL at 06:13

## 2019-01-14 RX ADMIN — Medication 1 PATCH: at 07:17

## 2019-01-14 RX ADMIN — Medication 100 MILLIGRAM(S): at 06:13

## 2019-01-14 RX ADMIN — SODIUM CHLORIDE 3 GRAM(S): 9 INJECTION INTRAMUSCULAR; INTRAVENOUS; SUBCUTANEOUS at 13:11

## 2019-01-14 RX ADMIN — Medication 1 PATCH: at 19:38

## 2019-01-14 RX ADMIN — SODIUM CHLORIDE 3 GRAM(S): 9 INJECTION INTRAMUSCULAR; INTRAVENOUS; SUBCUTANEOUS at 06:13

## 2019-01-14 RX ADMIN — Medication 100 MILLIGRAM(S): at 13:11

## 2019-01-14 RX ADMIN — Medication 1 PATCH: at 12:08

## 2019-01-14 NOTE — PROGRESS NOTE ADULT - ASSESSMENT
63 year-old man with history of alcohol abuse was found wandering around attempting to enter a locked industrial facility on 1/4/18 and was taken to [facility] when he was noted to be confused. He was reported to be agitated, oriented only to self, no following commands but briskly moving all limbs. Imaging revealed multifocal contusions and subarachnoid hemorrhage. CTA revealed possible AVM so he was transferred to [facility] for further management. Of note, he received 8mg of lorazepam at OSH. (04 Jan 2019 17:42)    PROCEDURE: 1/4 s/p ICP (intracranial pressure) monitoring      PLAN:  -Neuro: Tylenol for pain control   -Hyponatremia: salt tabs increased yesterday, repeat sodium was 132 today. Continue salt tabs and 1.2 FWR as discussed with hospitalist. BMP q 12 hours  -Encouraged incentive spirometry   -Norvasc for HTN  -Colace senna for bowel regimen  -DVT ppx: venodynes and sql  -PT/OT acute TBI. Undocumented/ uninsured. [facility] vs daily PT/OT optimization for discharge home.    Will discuss above with Dr. Gabriel Ivory #[phone]     More than 30 minutes were spent educating the patient and family regarding condition, medications, follow up plans, signs and symptoms to be concerned with, preparing paperwork, and questions answered regarding discharge.     Assessment:  Please Check When Present   []  GCS  E   V  M     Heart Failure: []Acute, [] acute on chronic , []chronic  Heart Failure:  [] Diastolic (HFpEF), [] Systolic (HFrEF), []Combined (HFpEF and HFrEF), [] RHF, [] Pulm HTN, [] Other    [] SAMUEL, [] ATN, [] AIN, [] other  [] CKD1, [] CKD2, [] CKD 3, [] CKD 4, [] CKD 5, []ESRD    Encephalopathy: [] Metabolic, [] Hepatic, [] toxic, [x] Neurological, [] Other    Abnormal Nurtitional Status: [] malnurtition (see nutrition note), [ ]underweight: BMI < 19, [] morbid obesity: BMI >40, [] Cachexia    [] Sepsis  [] hypovolemic shock,[] cardiogenic shock, [] hemorrhagic shock, [] neuogenic shock  [] Acute Respiratory Failure  []Cerebral edema, [] Brain compression/ herniation,   [] Functional quadriplegia  [] Acute blood loss anemia

## 2019-01-14 NOTE — PROGRESS NOTE ADULT - SUBJECTIVE AND OBJECTIVE BOX
SUBJECTIVE: Patient seen and examined at bedside. Denies any complaints at this time.     OVERNIGHT EVENTS: none     Vital Signs Last 24 Hrs    T(C): 37.1 (14 Jan 2019 08:42), Max: 37.3 (13 Jan 2019 20:13)  T(F): 98.7 (14 Jan 2019 08:42), Max: 99.1 (13 Jan 2019 20:13)  HR: 98 (14 Jan 2019 08:42) (76 - 98)  BP: 119/77 (14 Jan 2019 08:42) (118/78 - 147/82)  BP(mean): --  RR: 17 (14 Jan 2019 08:42) (16 - 19)  SpO2: 98% (14 Jan 2019 08:42) (93% - 98%)    PHYSICAL EXAM:    General: No Acute Distress     Neurological: Awake, alert oriented to person, place and time, Following Commands, PERRL, EOMI, Face Symmetrical, Speech Fluent, Moving all extremities, Muscle Strength -left upper 4+/5, No Drift, Sensation to Light Touch Intact    Pulmonary: Clear to Auscultation, No Rales, No Rhonchi, No Wheezes     Cardiovascular: S1, S2, Regular Rate and Rhythm     Gastrointestinal: Soft, Nontender, Nondistended     Incision: + staples, c/d/i     LABS:              01-14    132<L>  |  96  |  15  ----------------------------<  117<H>  4.3   |  24  |  0.69    Ca    9.8      14 Jan 2019 06:44        DIET: soft diet     IMAGING: no new imaging

## 2019-01-14 NOTE — PROGRESS NOTE ADULT - ASSESSMENT
------------------------------------------------------------------------------------------------  ASSESSMENT/RECOMMENDATIONS  62yo Male with functional and cognitive deficits after IPH with diffuse SAH and IVH.    #PT - strengthening, transfers, gait training  #OT - ADLs  #SLP - dysphagia eval and tx  #Pain - Tylenol prn  #DVT PPX - Lovenox  #Dispo - WHEN MEDICALLY STABLE, would recommend ACUTE inpatient rehabilitation for the functional deficits consisting of 3 hours of therapy/day & 24 hour RN/daily PMR physician for comorbid medical management. Will continue to follow for ongoing rehab needs and recommendations. Patient will be able to tolerate 3 hours a day. ------------------------------------------------------------------------------------------------  ASSESSMENT/RECOMMENDATIONS  62yo Male with functional and cognitive deficits after IPH with diffuse SAH and IVH.    #PT - strengthening, transfers, gait training  #OT - ADLs  #SLP - dysphagia eval and tx  #Pain - Tylenol prn  #DVT PPX - Lovenox  #Dispo - WHEN MEDICALLY STABLE, would recommend ACUTE inpatient rehabilitation for the functional deficits consisting of 3 hours of therapy/day & 24 hour RN/daily PMR physician for comorbid medical management. Will continue to follow for ongoing rehab needs and recommendations. Patient will be able to tolerate 3 hours a day.  ELOS- 4-6 wks  LTG- CG with gait and transfers

## 2019-01-14 NOTE — PROGRESS NOTE ADULT - SUBJECTIVE AND OBJECTIVE BOX
Authored by Dr Uriel Moreno [phone] / [phone]  After hours or if no response please page Hospitalist service [phone]    Patient is a 63y old  Male who presents with a chief complaint of AMS, IPH (14 Jan 2019 13:07)    SUBJECTIVE / OVERNIGHT EVENTS:    MEDICATIONS  (STANDING):  amLODIPine   Tablet 10 milliGRAM(s) Oral daily  docusate sodium 100 milliGRAM(s) Oral three times a day  doxazosin 8 milliGRAM(s) Oral at bedtime  enoxaparin Injectable 40 milliGRAM(s) SubCutaneous <User Schedule>  folic acid 1 milliGRAM(s) Oral daily  influenza   Vaccine 0.5 milliLiter(s) IntraMuscular once  multivitamin 1 Tablet(s) Oral daily  nicotine - 21 mG/24Hr(s) Patch 1 patch Transdermal daily  nicotine - Inhaler 1 Each Inhalation two times a day  senna 2 Tablet(s) Oral at bedtime  sodium chloride 3 Gram(s) Oral every 8 hours    MEDICATIONS  (PRN):  acetaminophen   Tablet .. 650 milliGRAM(s) Oral every 6 hours PRN Temp greater or equal to 38C (100.4F), Mild Pain (1 - 3)  diphenhydrAMINE 25 milliGRAM(s) Oral daily PRN Insomnia  haloperidol    Injectable 0.5 milliGRAM(s) IV Push once PRN agitaciones      Vital Signs Last 24 Hrs  T(C): 36.9 (14 Jan 2019 12:27), Max: 37.3 (13 Jan 2019 20:13)  T(F): 98.4 (14 Jan 2019 12:27), Max: 99.1 (13 Jan 2019 20:13)  HR: 88 (14 Jan 2019 12:27) (82 - 98)  BP: 111/71 (14 Jan 2019 12:27) (111/71 - 130/77)  BP(mean): --  RR: 16 (14 Jan 2019 12:27) (16 - 19)  SpO2: 95% (14 Jan 2019 12:27) (93% - 98%)  CAPILLARY BLOOD GLUCOSE        I&O's Summary    13 Jan 2019 07:01  -  14 Jan 2019 07:00  --------------------------------------------------------  IN: 0 mL / OUT: 250 mL / NET: -250 mL    14 Jan 2019 07:01  -  14 Jan 2019 14:25  --------------------------------------------------------  IN: 240 mL / OUT: 0 mL / NET: 240 mL        PHYSICAL EXAM  GENERAL: no acute distress  HEENT: PERRLA, EOMI, moist oropharynx, +scalp staples  RESPIRATORY: CTAB, no w/c   CARDIOVASCULAR: RRR, no murmurs, gallops, rubs  ABDOMINAL: soft, non-tender, non-distended, positive bowel sounds   EXTREMITIES: no clubbing, cyanosis, or edema  NEUROLOGICAL: AOx3  SKIN: no rashes or lesions   MUSCULOSKELETAL: no gross joint deformity    LABS:    01-14    132<L>  |  96  |  15  ----------------------------<  117<H>  4.3   |  24  |  0.69    Ca    9.8      14 Jan 2019 06:44      RADIOLOGY & ADDITIONAL TESTS:    Imaging Personally Reviewed:  Consultant(s) Notes Reviewed:    Care Discussed with Consultants/Other Providers:

## 2019-01-14 NOTE — CHART NOTE - NSCHARTNOTEFT_GEN_A_CORE
Nutrition Follow Up Note  Patient seen for: Nutrition follow-up assessment. Per chart, 63M p/w multifocal contusions and subarachnoid hemorrhage, s/p ICP bolt, acute encephalopathy, now with hyponatremia and fever yesterday 1/14. Bedside swallow eval done on 1/7 with recommendations for soft diet. Psychiatry following for nighttime awakenings. PMHx: alcohol abuse    Source: medical record    Diet :  soft, 1200mL fluid restriction    Patient reports:     PO intake :     Source for PO intake:    Weight 149.9 pounds (1/4)  No new weights.     Pertinent Medications: MEDICATIONS  (STANDING):  amLODIPine   Tablet 10 milliGRAM(s) Oral daily  docusate sodium 100 milliGRAM(s) Oral three times a day  doxazosin 8 milliGRAM(s) Oral at bedtime  enoxaparin Injectable 40 milliGRAM(s) SubCutaneous <User Schedule>  folic acid 1 milliGRAM(s) Oral daily  influenza   Vaccine 0.5 milliLiter(s) IntraMuscular once  multivitamin 1 Tablet(s) Oral daily  nicotine - 21 mG/24Hr(s) Patch 1 patch Transdermal daily  nicotine - Inhaler 1 Each Inhalation two times a day  senna 2 Tablet(s) Oral at bedtime  sodium chloride 3 Gram(s) Oral every 8 hours    MEDICATIONS  (PRN):  acetaminophen   Tablet .. 650 milliGRAM(s) Oral every 6 hours PRN Temp greater or equal to 38C (100.4F), Mild Pain (1 - 3)  diphenhydrAMINE 25 milliGRAM(s) Oral daily PRN Insomnia  haloperidol    Injectable 0.5 milliGRAM(s) IV Push once PRN agitaciones    Pertinent Labs: 01-14 @ 06:44: Na 132<L>, BUN 15, Cr 0.69, <H>, K+ 4.3,   01-13 @ 17:48: Na 135, BUN 12, Cr 0.69, <H>, K+ 4.7  01-4: HgbA1c 5.9%      Skin per nursing documentation: no pressure injuries per nursing flow sheet.  Edema: none noted per nursing flow sheet    Estimated Needs:   [x] no change since previous assessment  [ ] recalculated:     Previous Nutrition Diagnosis: swallowing difficulty  Nutrition Diagnosis is: ongoing- being addressed with soft diet    New Nutrition Diagnosis: n/a  Related to:    As evidenced by:     Recommend  1) Continue soft diet- fluid restriction per team  2) Continue to encourage PO intake.    Monitoring and Evaluation:   Continue to monitor Nutritional intake, Tolerance to diet prescription, weights, labs, skin integrity    RD remains available upon request and will follow up per protocol Nutrition Follow Up Note  Patient seen for: Nutrition follow-up assessment. Per chart, 63M p/w multifocal contusions and subarachnoid hemorrhage, s/p ICP bolt, acute encephalopathy, now with hyponatremia and fever yesterday 1/14. Bedside swallow eval done on 1/7 with recommendations for soft diet. Psychiatry following for nighttime awakenings. PMHx: alcohol abuse    Source: pt, RN, medical record    Diet :  soft, 1200mL fluid restriction    Patient was not interested in conversation at this time, but reports no GI distress and tolerating consistency well. Assisted pt with lunch tray set-up. Per RN, pt has good po intake and last BM was this morning.    PO intake : good, >75%    Weight 149.9 pounds (1/4)  No new weights.     Pertinent Medications: MEDICATIONS  (STANDING):  amLODIPine   Tablet 10 milliGRAM(s) Oral daily  docusate sodium 100 milliGRAM(s) Oral three times a day  doxazosin 8 milliGRAM(s) Oral at bedtime  enoxaparin Injectable 40 milliGRAM(s) SubCutaneous <User Schedule>  folic acid 1 milliGRAM(s) Oral daily  influenza   Vaccine 0.5 milliLiter(s) IntraMuscular once  multivitamin 1 Tablet(s) Oral daily  nicotine - 21 mG/24Hr(s) Patch 1 patch Transdermal daily  nicotine - Inhaler 1 Each Inhalation two times a day  senna 2 Tablet(s) Oral at bedtime  sodium chloride 3 Gram(s) Oral every 8 hours    MEDICATIONS  (PRN):  acetaminophen   Tablet .. 650 milliGRAM(s) Oral every 6 hours PRN Temp greater or equal to 38C (100.4F), Mild Pain (1 - 3)  diphenhydrAMINE 25 milliGRAM(s) Oral daily PRN Insomnia  haloperidol    Injectable 0.5 milliGRAM(s) IV Push once PRN agitaciones    Pertinent Labs: 01-14 @ 06:44: Na 132<L>, BUN 15, Cr 0.69, <H>, K+ 4.3,   01-13 @ 17:48: Na 135, BUN 12, Cr 0.69, <H>, K+ 4.7  01-4: HgbA1c 5.9%      Skin per nursing documentation: no pressure injuries per nursing flow sheet.  Edema: none noted per nursing flow sheet    Estimated Needs:   [x] no change since previous assessment  [ ] recalculated:     Previous Nutrition Diagnosis: swallowing difficulty  Nutrition Diagnosis is: ongoing- being addressed with soft diet    New Nutrition Diagnosis: n/a  Related to:    As evidenced by:     Recommend  1) Continue soft diet- fluid restriction per team  2) Continue to encourage PO intake.    Monitoring and Evaluation:   Continue to monitor Nutritional intake, Tolerance to diet prescription, weights, labs, skin integrity    RD remains available upon request and will follow up per protocol

## 2019-01-14 NOTE — PROGRESS NOTE ADULT - SUBJECTIVE AND OBJECTIVE BOX
Patient is a 63y old  Male who presents with a chief complaint of AMS, IP (10 Bernardino 2019 14:03)    HPI:  Patient tolerating therapies.  Min A with transfers and gait.    MEDICATIONS  (STANDING):  amLODIPine   Tablet 10 milliGRAM(s) Oral daily  docusate sodium 100 milliGRAM(s) Oral three times a day  doxazosin 8 milliGRAM(s) Oral at bedtime  enoxaparin Injectable 40 milliGRAM(s) SubCutaneous <User Schedule>  folic acid 1 milliGRAM(s) Oral daily  influenza   Vaccine 0.5 milliLiter(s) IntraMuscular once  multivitamin 1 Tablet(s) Oral daily  nicotine - 21 mG/24Hr(s) Patch 1 patch Transdermal daily  nicotine - Inhaler 1 Each Inhalation two times a day  senna 2 Tablet(s) Oral at bedtime  sodium chloride 3 Gram(s) Oral every 8 hours    MEDICATIONS  (PRN):  acetaminophen   Tablet .. 650 milliGRAM(s) Oral every 6 hours PRN Temp greater or equal to 38C (100.4F), Mild Pain (1 - 3)  diphenhydrAMINE 25 milliGRAM(s) Oral daily PRN Insomnia  haloperidol    Injectable 0.5 milliGRAM(s) IV Push once PRN agitaciones    Vital Signs Last 24 Hrs  T(C): 36.9 (14 Jan 2019 12:27), Max: 37.3 (13 Jan 2019 20:13)  T(F): 98.4 (14 Jan 2019 12:27), Max: 99.1 (13 Jan 2019 20:13)  HR: 88 (14 Jan 2019 12:27) (82 - 98)  BP: 111/71 (14 Jan 2019 12:27) (111/71 - 130/77)  BP(mean): --  RR: 16 (14 Jan 2019 12:27) (16 - 19)  SpO2: 95% (14 Jan 2019 12:27) (93% - 98%)    PHYSICAL EXAMINATION  Constitutional - NAD, Comfortable  HEENT -Incision intact  Chest - Breathing comfortably, No wheezing  Cardiovascular - S1S2   Abdomen - Soft   Extremities - No C/C/E, No calf tenderness   Neurologic Exam -                     Alert     Follows verbal instruction with slow processing       Motor -      4+/5 bl UE and LEs                  Sensory - Intact to LT B/L     Reflexes - DTR Intact, No primitive reflexes  Psychiatric - Mood stable, Affect WNL

## 2019-01-14 NOTE — PROGRESS NOTE ADULT - ASSESSMENT
63M w/alcohol abuse has traumatic SDH SAH brain contusion s/p ICP bolt, intubated, now stable on neurosurgical floor,

## 2019-01-15 LAB
ANION GAP SERPL CALC-SCNC: 13 MMOL/L — SIGNIFICANT CHANGE UP (ref 5–17)
ANION GAP SERPL CALC-SCNC: 13 MMOL/L — SIGNIFICANT CHANGE UP (ref 5–17)
BUN SERPL-MCNC: 15 MG/DL — SIGNIFICANT CHANGE UP (ref 7–23)
BUN SERPL-MCNC: 16 MG/DL — SIGNIFICANT CHANGE UP (ref 7–23)
CALCIUM SERPL-MCNC: 9.3 MG/DL — SIGNIFICANT CHANGE UP (ref 8.4–10.5)
CALCIUM SERPL-MCNC: 9.4 MG/DL — SIGNIFICANT CHANGE UP (ref 8.4–10.5)
CHLORIDE SERPL-SCNC: 97 MMOL/L — SIGNIFICANT CHANGE UP (ref 96–108)
CHLORIDE SERPL-SCNC: 97 MMOL/L — SIGNIFICANT CHANGE UP (ref 96–108)
CO2 SERPL-SCNC: 26 MMOL/L — SIGNIFICANT CHANGE UP (ref 22–31)
CO2 SERPL-SCNC: 26 MMOL/L — SIGNIFICANT CHANGE UP (ref 22–31)
CREAT SERPL-MCNC: 0.68 MG/DL — SIGNIFICANT CHANGE UP (ref 0.5–1.3)
CREAT SERPL-MCNC: 0.68 MG/DL — SIGNIFICANT CHANGE UP (ref 0.5–1.3)
GLUCOSE SERPL-MCNC: 156 MG/DL — HIGH (ref 70–99)
GLUCOSE SERPL-MCNC: 176 MG/DL — HIGH (ref 70–99)
POTASSIUM SERPL-MCNC: 3.8 MMOL/L — SIGNIFICANT CHANGE UP (ref 3.5–5.3)
POTASSIUM SERPL-MCNC: 4.2 MMOL/L — SIGNIFICANT CHANGE UP (ref 3.5–5.3)
POTASSIUM SERPL-SCNC: 3.8 MMOL/L — SIGNIFICANT CHANGE UP (ref 3.5–5.3)
POTASSIUM SERPL-SCNC: 4.2 MMOL/L — SIGNIFICANT CHANGE UP (ref 3.5–5.3)
SODIUM SERPL-SCNC: 136 MMOL/L — SIGNIFICANT CHANGE UP (ref 135–145)
SODIUM SERPL-SCNC: 136 MMOL/L — SIGNIFICANT CHANGE UP (ref 135–145)

## 2019-01-15 PROCEDURE — 99232 SBSQ HOSP IP/OBS MODERATE 35: CPT

## 2019-01-15 RX ADMIN — Medication 650 MILLIGRAM(S): at 14:26

## 2019-01-15 RX ADMIN — Medication 1 TABLET(S): at 12:56

## 2019-01-15 RX ADMIN — Medication 650 MILLIGRAM(S): at 22:00

## 2019-01-15 RX ADMIN — SENNA PLUS 2 TABLET(S): 8.6 TABLET ORAL at 21:33

## 2019-01-15 RX ADMIN — Medication 1 MILLIGRAM(S): at 12:56

## 2019-01-15 RX ADMIN — AMLODIPINE BESYLATE 10 MILLIGRAM(S): 2.5 TABLET ORAL at 05:11

## 2019-01-15 RX ADMIN — Medication 650 MILLIGRAM(S): at 13:26

## 2019-01-15 RX ADMIN — Medication 1 PATCH: at 12:56

## 2019-01-15 RX ADMIN — Medication 1 EACH: at 05:12

## 2019-01-15 RX ADMIN — Medication 100 MILLIGRAM(S): at 12:57

## 2019-01-15 RX ADMIN — Medication 1 EACH: at 17:29

## 2019-01-15 RX ADMIN — SODIUM CHLORIDE 3 GRAM(S): 9 INJECTION INTRAMUSCULAR; INTRAVENOUS; SUBCUTANEOUS at 05:11

## 2019-01-15 RX ADMIN — SODIUM CHLORIDE 3 GRAM(S): 9 INJECTION INTRAMUSCULAR; INTRAVENOUS; SUBCUTANEOUS at 12:57

## 2019-01-15 RX ADMIN — Medication 25 MILLIGRAM(S): at 21:33

## 2019-01-15 RX ADMIN — Medication 100 MILLIGRAM(S): at 21:33

## 2019-01-15 RX ADMIN — Medication 1 PATCH: at 07:00

## 2019-01-15 RX ADMIN — Medication 100 MILLIGRAM(S): at 05:11

## 2019-01-15 RX ADMIN — Medication 1 PATCH: at 19:25

## 2019-01-15 RX ADMIN — SODIUM CHLORIDE 3 GRAM(S): 9 INJECTION INTRAMUSCULAR; INTRAVENOUS; SUBCUTANEOUS at 21:33

## 2019-01-15 RX ADMIN — Medication 8 MILLIGRAM(S): at 21:33

## 2019-01-15 RX ADMIN — Medication 1 PATCH: at 13:00

## 2019-01-15 RX ADMIN — Medication 650 MILLIGRAM(S): at 21:33

## 2019-01-15 RX ADMIN — ENOXAPARIN SODIUM 40 MILLIGRAM(S): 100 INJECTION SUBCUTANEOUS at 17:26

## 2019-01-15 NOTE — PROGRESS NOTE ADULT - SUBJECTIVE AND OBJECTIVE BOX
Authored by Dr Uriel Moreno [phone]  After hours or if no response please page Hospitalist service [phone]    Patient is a 63y old  Male who presents with a chief complaint of AMS, IPH (14 Jan 2019 14:25)    SUBJECTIVE / OVERNIGHT EVENTS: pt hungry, no new complaints, pain improving    MEDICATIONS  (STANDING):  amLODIPine   Tablet 10 milliGRAM(s) Oral daily  docusate sodium 100 milliGRAM(s) Oral three times a day  doxazosin 8 milliGRAM(s) Oral at bedtime  enoxaparin Injectable 40 milliGRAM(s) SubCutaneous <User Schedule>  folic acid 1 milliGRAM(s) Oral daily  influenza   Vaccine 0.5 milliLiter(s) IntraMuscular once  multivitamin 1 Tablet(s) Oral daily  nicotine - 21 mG/24Hr(s) Patch 1 patch Transdermal daily  nicotine - Inhaler 1 Each Inhalation two times a day  senna 2 Tablet(s) Oral at bedtime  sodium chloride 3 Gram(s) Oral every 8 hours    MEDICATIONS  (PRN):  acetaminophen   Tablet .. 650 milliGRAM(s) Oral every 6 hours PRN Temp greater or equal to 38C (100.4F), Mild Pain (1 - 3)  diphenhydrAMINE 25 milliGRAM(s) Oral daily PRN Insomnia  haloperidol    Injectable 0.5 milliGRAM(s) IV Push once PRN agitaciones      Vital Signs Last 24 Hrs  T(C): 36.8 (15 Bernardino 2019 09:52), Max: 36.9 (14 Jan 2019 12:27)  T(F): 98.3 (15 Bernardino 2019 09:52), Max: 98.5 (14 Jan 2019 23:00)  HR: 90 (15 Bernardino 2019 09:52) (88 - 96)  BP: 105/67 (15 Bernardino 2019 09:52) (105/67 - 118/81)  BP(mean): --  RR: 18 (15 Bernardino 2019 09:52) (16 - 18)  SpO2: 97% (15 Bernardino 2019 09:52) (95% - 97%)  CAPILLARY BLOOD GLUCOSE        I&O's Summary    14 Jan 2019 07:01  -  15 Bernardino 2019 07:00  --------------------------------------------------------  IN: 360 mL / OUT: 600 mL / NET: -240 mL    15 Bernardino 2019 07:01  -  15 Bernardino 2019 11:04  --------------------------------------------------------  IN: 240 mL / OUT: 400 mL / NET: -160 mL        PHYSICAL EXAM  GENERAL: no acute distress  HEENT: PERRLA, EOMI, moist oropharynx, +scalp staples  RESPIRATORY: CTAB, no w/c   CARDIOVASCULAR: RRR, no murmurs, gallops, rubs  ABDOMINAL: soft, non-tender, non-distended, positive bowel sounds   EXTREMITIES: no clubbing, cyanosis, or edema  NEUROLOGICAL: AOx3  SKIN: no rashes or lesions   MUSCULOSKELETAL: no gross joint deformity    LABS:    01-15    136  |  97  |  16  ----------------------------<  176<H>  3.8   |  26  |  0.68    Ca    9.4      15 Bernardino 2019 07:04      RADIOLOGY & ADDITIONAL TESTS:    Imaging Personally Reviewed:  Consultant(s) Notes Reviewed:    Care Discussed with Consultants/Other Providers: NSGY team

## 2019-01-15 NOTE — PROGRESS NOTE ADULT - PROBLEM SELECTOR PLAN 7
will need outpatient f/u  not possible to treat ischemia if present in light of recent ich  no evidence of HF

## 2019-01-15 NOTE — PROGRESS NOTE ADULT - PROBLEM SELECTOR PLAN 8
resolved  hemolysis unlikely

## 2019-01-15 NOTE — PROGRESS NOTE ADULT - SUBJECTIVE AND OBJECTIVE BOX
SUBJECTIVE: Patient seen and examined at bedside. Denies any complaints at this time.     OVERNIGHT EVENTS: none     Vital Signs Last 24 Hrs    T(C): 36.7 (15 Bernardino 2019 12:47), Max: 36.9 (14 Jan 2019 23:00)  T(F): 98.1 (15 Bernardino 2019 12:47), Max: 98.5 (14 Jan 2019 23:00)  HR: 92 (15 Bernardino 2019 12:47) (88 - 96)  BP: 121/62 (15 Bernardino 2019 12:47) (105/67 - 121/62)  BP(mean): --  RR: 18 (15 Bernardino 2019 12:47) (16 - 18)  SpO2: 98% (15 Bernardino 2019 12:47) (96% - 98%)    PHYSICAL EXAM:    General: No Acute Distress     Neurological: Awake, alert oriented to person, place and time, Following Commands, PERRL, EOMI, Face Symmetrical, Speech Fluent, Moving all extremities, Muscle Strength -left upper 4+/5, No Drift, Sensation to Light Touch Intact    Pulmonary: Clear to Auscultation, No Rales, No Rhonchi, No Wheezes     Cardiovascular: S1, S2, Regular Rate and Rhythm     Gastrointestinal: Soft, Nontender, Nondistended     Incision: + staples, c/d/i     LABS:              01-15    136  |  97  |  16  ----------------------------<  176<H>  3.8   |  26  |  0.68    Ca    9.4      15 Bernardino 2019 07:04        DIET: soft diet     IMAGING: no new imaging

## 2019-01-15 NOTE — PROGRESS NOTE ADULT - PROBLEM SELECTOR PROBLEM 8
Elevated bilirubin

## 2019-01-15 NOTE — PROGRESS NOTE ADULT - PROBLEM SELECTOR PLAN 2
stable  - increase salt tabs to 3gm tid - wean per nsgy  - 1.2L fluid restriction  - continue monitoring Na

## 2019-01-15 NOTE — PROGRESS NOTE ADULT - ASSESSMENT
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 He was reported to be agitated, oriented only to self, no following commands but briskly moving all limbs. Imaging revealed multifocal contusions and subarachnoid hemorrhage. CTA revealed possible AVM so he was transferred to [facility] for further management. Of note, he received 8mg of lorazepam at OSH. (04 Jan 2019 17:42)    PROCEDURE: 1/4 s/p ICP (intracranial pressure) monitoring      PLAN:  -Neuro: Tylenol for pain control   -Hyponatremia stable.  Continue salt tabs and 1.2 FWR as discussed with hospitalist. BMP q 12 hours  -Encouraged incentive spirometry   -Norvasc for HTN  -Colace senna for bowel regimen  -DVT ppx: venodynes and sql  -PT/OT acute TBI. Undocumented/ uninsured. [facility] vs daily PT/OT optimization for discharge home.    Will discuss above with Dr. Gabriel Ivory #[phone]     More than 30 minutes were spent educating the patient and family regarding condition, medications, follow up plans, signs and symptoms to be concerned with, preparing paperwork, and questions answered regarding discharge.     Assessment:  Please Check When Present   []  GCS  E   V  M     Heart Failure: []Acute, [] acute on chronic , []chronic  Heart Failure:  [] Diastolic (HFpEF), [] Systolic (HFrEF), []Combined (HFpEF and HFrEF), [] RHF, [] Pulm HTN, [] Other    [] SAMUEL, [] ATN, [] AIN, [] other  [] CKD1, [] CKD2, [] CKD 3, [] CKD 4, [] CKD 5, []ESRD    Encephalopathy: [] Metabolic, [] Hepatic, [] toxic, [x] Neurological, [] Other    Abnormal Nurtitional Status: [] malnurtition (see nutrition note), [ ]underweight: BMI < 19, [] morbid obesity: BMI >40, [] Cachexia    [] Sepsis  [] hypovolemic shock,[] cardiogenic shock, [] hemorrhagic shock, [] neuogenic shock  [] Acute Respiratory Failure  []Cerebral edema, [] Brain compression/ herniation,   [] Functional quadriplegia  [] Acute blood loss anemia

## 2019-01-15 NOTE — PROGRESS NOTE ADULT - PROBLEM SELECTOR PROBLEM 9
Traumatic rhabdomyolysis, initial encounter

## 2019-01-15 NOTE — PROGRESS NOTE ADULT - PROBLEM SELECTOR PROBLEM 7
Left ventricular dysfunction

## 2019-01-16 VITALS
TEMPERATURE: 98 F | OXYGEN SATURATION: 99 % | SYSTOLIC BLOOD PRESSURE: 128 MMHG | HEART RATE: 78 BPM | DIASTOLIC BLOOD PRESSURE: 77 MMHG | RESPIRATION RATE: 16 BRPM

## 2019-01-16 LAB
ANION GAP SERPL CALC-SCNC: 12 MMOL/L — SIGNIFICANT CHANGE UP (ref 5–17)
BUN SERPL-MCNC: 15 MG/DL — SIGNIFICANT CHANGE UP (ref 7–23)
CALCIUM SERPL-MCNC: 9.5 MG/DL — SIGNIFICANT CHANGE UP (ref 8.4–10.5)
CHLORIDE SERPL-SCNC: 99 MMOL/L — SIGNIFICANT CHANGE UP (ref 96–108)
CO2 SERPL-SCNC: 26 MMOL/L — SIGNIFICANT CHANGE UP (ref 22–31)
CREAT SERPL-MCNC: 0.69 MG/DL — SIGNIFICANT CHANGE UP (ref 0.5–1.3)
GLUCOSE SERPL-MCNC: 94 MG/DL — SIGNIFICANT CHANGE UP (ref 70–99)
POTASSIUM SERPL-MCNC: 3.9 MMOL/L — SIGNIFICANT CHANGE UP (ref 3.5–5.3)
POTASSIUM SERPL-SCNC: 3.9 MMOL/L — SIGNIFICANT CHANGE UP (ref 3.5–5.3)
SODIUM SERPL-SCNC: 137 MMOL/L — SIGNIFICANT CHANGE UP (ref 135–145)

## 2019-01-16 PROCEDURE — P9037: CPT

## 2019-01-16 PROCEDURE — 82803 BLOOD GASES ANY COMBINATION: CPT

## 2019-01-16 PROCEDURE — C9254: CPT

## 2019-01-16 PROCEDURE — 71250 CT THORAX DX C-: CPT

## 2019-01-16 PROCEDURE — 84484 ASSAY OF TROPONIN QUANT: CPT

## 2019-01-16 PROCEDURE — 87040 BLOOD CULTURE FOR BACTERIA: CPT

## 2019-01-16 PROCEDURE — 82435 ASSAY OF BLOOD CHLORIDE: CPT

## 2019-01-16 PROCEDURE — 84300 ASSAY OF URINE SODIUM: CPT

## 2019-01-16 PROCEDURE — 36430 TRANSFUSION BLD/BLD COMPNT: CPT

## 2019-01-16 PROCEDURE — 93306 TTE W/DOPPLER COMPLETE: CPT

## 2019-01-16 PROCEDURE — 83935 ASSAY OF URINE OSMOLALITY: CPT

## 2019-01-16 PROCEDURE — 76700 US EXAM ABDOM COMPLETE: CPT

## 2019-01-16 PROCEDURE — 97166 OT EVAL MOD COMPLEX 45 MIN: CPT

## 2019-01-16 PROCEDURE — 97116 GAIT TRAINING THERAPY: CPT

## 2019-01-16 PROCEDURE — 83735 ASSAY OF MAGNESIUM: CPT

## 2019-01-16 PROCEDURE — 82553 CREATINE MB FRACTION: CPT

## 2019-01-16 PROCEDURE — 84443 ASSAY THYROID STIM HORMONE: CPT

## 2019-01-16 PROCEDURE — 70450 CT HEAD/BRAIN W/O DYE: CPT

## 2019-01-16 PROCEDURE — 80048 BASIC METABOLIC PNL TOTAL CA: CPT

## 2019-01-16 PROCEDURE — 84132 ASSAY OF SERUM POTASSIUM: CPT

## 2019-01-16 PROCEDURE — 82140 ASSAY OF AMMONIA: CPT

## 2019-01-16 PROCEDURE — 83010 ASSAY OF HAPTOGLOBIN QUANT: CPT

## 2019-01-16 PROCEDURE — 71045 X-RAY EXAM CHEST 1 VIEW: CPT

## 2019-01-16 PROCEDURE — 86900 BLOOD TYPING SEROLOGIC ABO: CPT

## 2019-01-16 PROCEDURE — 84145 PROCALCITONIN (PCT): CPT

## 2019-01-16 PROCEDURE — 97110 THERAPEUTIC EXERCISES: CPT

## 2019-01-16 PROCEDURE — 92526 ORAL FUNCTION THERAPY: CPT

## 2019-01-16 PROCEDURE — 84480 ASSAY TRIIODOTHYRONINE (T3): CPT

## 2019-01-16 PROCEDURE — 99239 HOSP IP/OBS DSCHRG MGMT >30: CPT

## 2019-01-16 PROCEDURE — 82947 ASSAY GLUCOSE BLOOD QUANT: CPT

## 2019-01-16 PROCEDURE — 80076 HEPATIC FUNCTION PANEL: CPT

## 2019-01-16 PROCEDURE — 85610 PROTHROMBIN TIME: CPT

## 2019-01-16 PROCEDURE — 80307 DRUG TEST PRSMV CHEM ANLYZR: CPT

## 2019-01-16 PROCEDURE — 80061 LIPID PANEL: CPT

## 2019-01-16 PROCEDURE — 84100 ASSAY OF PHOSPHORUS: CPT

## 2019-01-16 PROCEDURE — 86901 BLOOD TYPING SEROLOGIC RH(D): CPT

## 2019-01-16 PROCEDURE — 85014 HEMATOCRIT: CPT

## 2019-01-16 PROCEDURE — 83615 LACTATE (LD) (LDH) ENZYME: CPT

## 2019-01-16 PROCEDURE — 84436 ASSAY OF TOTAL THYROXINE: CPT

## 2019-01-16 PROCEDURE — 87086 URINE CULTURE/COLONY COUNT: CPT

## 2019-01-16 PROCEDURE — 97530 THERAPEUTIC ACTIVITIES: CPT

## 2019-01-16 PROCEDURE — 85027 COMPLETE CBC AUTOMATED: CPT

## 2019-01-16 PROCEDURE — 85730 THROMBOPLASTIN TIME PARTIAL: CPT

## 2019-01-16 PROCEDURE — 99232 SBSQ HOSP IP/OBS MODERATE 35: CPT

## 2019-01-16 PROCEDURE — 93005 ELECTROCARDIOGRAM TRACING: CPT

## 2019-01-16 PROCEDURE — 86850 RBC ANTIBODY SCREEN: CPT

## 2019-01-16 PROCEDURE — 94003 VENT MGMT INPAT SUBQ DAY: CPT

## 2019-01-16 PROCEDURE — 81001 URINALYSIS AUTO W/SCOPE: CPT

## 2019-01-16 PROCEDURE — 84295 ASSAY OF SERUM SODIUM: CPT

## 2019-01-16 PROCEDURE — 83605 ASSAY OF LACTIC ACID: CPT

## 2019-01-16 PROCEDURE — 92610 EVALUATE SWALLOWING FUNCTION: CPT

## 2019-01-16 PROCEDURE — 72125 CT NECK SPINE W/O DYE: CPT

## 2019-01-16 PROCEDURE — 82550 ASSAY OF CK (CPK): CPT

## 2019-01-16 PROCEDURE — 97162 PT EVAL MOD COMPLEX 30 MIN: CPT

## 2019-01-16 PROCEDURE — 82330 ASSAY OF CALCIUM: CPT

## 2019-01-16 PROCEDURE — 94002 VENT MGMT INPAT INIT DAY: CPT

## 2019-01-16 PROCEDURE — 80053 COMPREHEN METABOLIC PANEL: CPT

## 2019-01-16 PROCEDURE — 74176 CT ABD & PELVIS W/O CONTRAST: CPT

## 2019-01-16 PROCEDURE — 83036 HEMOGLOBIN GLYCOSYLATED A1C: CPT

## 2019-01-16 PROCEDURE — 81003 URINALYSIS AUTO W/O SCOPE: CPT

## 2019-01-16 RX ORDER — TRAMADOL HYDROCHLORIDE 50 MG/1
50 TABLET ORAL ONCE
Qty: 0 | Refills: 0 | Status: DISCONTINUED | OUTPATIENT
Start: 2019-01-16 | End: 2019-01-16

## 2019-01-16 RX ORDER — ACETAMINOPHEN 500 MG
2 TABLET ORAL
Qty: 0 | Refills: 0 | COMMUNITY
Start: 2019-01-16

## 2019-01-16 RX ORDER — AMLODIPINE BESYLATE 2.5 MG/1
1 TABLET ORAL
Qty: 30 | Refills: 0 | OUTPATIENT
Start: 2019-01-16 | End: 2019-02-14

## 2019-01-16 RX ORDER — NICOTINE POLACRILEX 2 MG
1 GUM BUCCAL
Qty: 0 | Refills: 0 | COMMUNITY
Start: 2019-01-16

## 2019-01-16 RX ADMIN — Medication 1 TABLET(S): at 12:02

## 2019-01-16 RX ADMIN — TRAMADOL HYDROCHLORIDE 50 MILLIGRAM(S): 50 TABLET ORAL at 12:02

## 2019-01-16 RX ADMIN — TRAMADOL HYDROCHLORIDE 50 MILLIGRAM(S): 50 TABLET ORAL at 13:00

## 2019-01-16 RX ADMIN — Medication 650 MILLIGRAM(S): at 20:00

## 2019-01-16 RX ADMIN — Medication 1 PATCH: at 12:59

## 2019-01-16 RX ADMIN — Medication 650 MILLIGRAM(S): at 18:17

## 2019-01-16 RX ADMIN — Medication 1 PATCH: at 20:00

## 2019-01-16 RX ADMIN — Medication 650 MILLIGRAM(S): at 05:30

## 2019-01-16 RX ADMIN — Medication 650 MILLIGRAM(S): at 05:01

## 2019-01-16 RX ADMIN — SODIUM CHLORIDE 3 GRAM(S): 9 INJECTION INTRAMUSCULAR; INTRAVENOUS; SUBCUTANEOUS at 05:01

## 2019-01-16 RX ADMIN — Medication 650 MILLIGRAM(S): at 09:54

## 2019-01-16 RX ADMIN — Medication 1 PATCH: at 07:29

## 2019-01-16 RX ADMIN — AMLODIPINE BESYLATE 10 MILLIGRAM(S): 2.5 TABLET ORAL at 05:01

## 2019-01-16 RX ADMIN — Medication 650 MILLIGRAM(S): at 10:54

## 2019-01-16 RX ADMIN — Medication 100 MILLIGRAM(S): at 15:59

## 2019-01-16 RX ADMIN — Medication 1 MILLIGRAM(S): at 12:02

## 2019-01-16 RX ADMIN — Medication 1 PATCH: at 12:00

## 2019-01-16 RX ADMIN — Medication 100 MILLIGRAM(S): at 05:01

## 2019-01-16 NOTE — PROGRESS NOTE ADULT - PROBLEM SELECTOR PLAN 4
mgmt per nsgy
s/p treatment dose thiamine  d/c'ed ciwa monitoring  SW f/u
Will need follow up out patient Urology
mgmt per nsgy

## 2019-01-16 NOTE — PROGRESS NOTE ADULT - PROBLEM SELECTOR PROBLEM 6
Renal lesion
Renal lesion
Left ventricular dysfunction
Renal lesion

## 2019-01-16 NOTE — PROGRESS NOTE ADULT - SUBJECTIVE AND OBJECTIVE BOX
Authored by Dr Uriel Moreno [phone]  After hours or if no response please page Hospitalist service [phone]    Patient is a 63y old  Male who presents with a chief complaint of AMS, IPH (15 Bernardino 2019 13:01)    SUBJECTIVE / OVERNIGHT EVENTS: no new complaints, stable aches and pains, pt desires discharge to home    MEDICATIONS  (STANDING):  amLODIPine   Tablet 10 milliGRAM(s) Oral daily  docusate sodium 100 milliGRAM(s) Oral three times a day  enoxaparin Injectable 40 milliGRAM(s) SubCutaneous <User Schedule>  folic acid 1 milliGRAM(s) Oral daily  influenza   Vaccine 0.5 milliLiter(s) IntraMuscular once  multivitamin 1 Tablet(s) Oral daily  nicotine - 21 mG/24Hr(s) Patch 1 patch Transdermal daily  nicotine - Inhaler 1 Each Inhalation two times a day  senna 2 Tablet(s) Oral at bedtime    MEDICATIONS  (PRN):  acetaminophen   Tablet .. 650 milliGRAM(s) Oral every 6 hours PRN Temp greater or equal to 38C (100.4F), Mild Pain (1 - 3)      Vital Signs Last 24 Hrs  T(C): 36.2 (16 Jan 2019 07:43), Max: 37.1 (16 Jan 2019 00:09)  T(F): 97.1 (16 Jan 2019 07:43), Max: 98.7 (16 Jan 2019 00:09)  HR: 83 (16 Jan 2019 07:43) (83 - 96)  BP: 120/81 (16 Jan 2019 07:43) (106/68 - 120/81)  BP(mean): --  RR: 18 (16 Jan 2019 07:43) (18 - 18)  SpO2: 97% (16 Jan 2019 07:43) (96% - 99%)  CAPILLARY BLOOD GLUCOSE        I&O's Summary    15 Bernardino 2019 07:01  -  16 Jan 2019 07:00  --------------------------------------------------------  IN: 720 mL / OUT: 1100 mL / NET: -380 mL        PHYSICAL EXAM  GENERAL: no acute distress  HEENT: PERRLA, EOMI, moist oropharynx, +scalp staples  RESPIRATORY: CTAB, no w/c   CARDIOVASCULAR: RRR, no murmurs, gallops, rubs  ABDOMINAL: soft, non-tender, non-distended, positive bowel sounds   EXTREMITIES: no clubbing, cyanosis, or edema  NEUROLOGICAL: AOx3  SKIN: no rashes or lesions   MUSCULOSKELETAL: no gross joint deformity    LABS:    01-16    137  |  99  |  15  ----------------------------<  94  3.9   |  26  |  0.69    Ca    9.5      16 Jan 2019 05:46      RADIOLOGY & ADDITIONAL TESTS:    Imaging Personally Reviewed:  Consultant(s) Notes Reviewed:    Care Discussed with Consultants/Other Providers: NSGY re dispo

## 2019-01-16 NOTE — PROGRESS NOTE ADULT - ASSESSMENT
63M w/alcohol abuse has traumatic SDH SAH brain contusion s/p ICP bolt, intubated, bolt discontinued 1/6/19  course c/b agitation, delirium related alcohol withdrawal, encephalopathy,  fevers, hyponatremia      Plan    Neuro stable. scalp staples discontinued   Delirium resolved   Hyponatremia resolved. D/c salt tabs   Vitals stable- On norvasc  PT- home PT. d/c today. d/w daughter  medical clinic appointment setup

## 2019-01-16 NOTE — PROGRESS NOTE ADULT - PROBLEM SELECTOR PLAN 6
outpatient urology
will need outpatient f/u  not possible to treat ischemia if present in light of recent ich  no evidence of HF
outpatient urology

## 2019-01-16 NOTE — PROGRESS NOTE ADULT - ATTENDING COMMENTS
no objection to d/c - needs to f/u issues as above w/PMD
dispo pending stabilization of sodium issues

## 2019-01-16 NOTE — PROGRESS NOTE ADULT - PROBLEM SELECTOR PROBLEM 3
Acute encephalopathy
SAH (subarachnoid hemorrhage)
Alcohol abuse
Acute encephalopathy
Acute encephalopathy

## 2019-01-16 NOTE — PROGRESS NOTE ADULT - PROBLEM SELECTOR PROBLEM 4
SAH (subarachnoid hemorrhage)
SAH (subarachnoid hemorrhage)
Alcohol abuse
SAH (subarachnoid hemorrhage)
Renal lesion
SAH (subarachnoid hemorrhage)

## 2019-01-16 NOTE — PROGRESS NOTE ADULT - PROVIDER SPECIALTY LIST ADULT
Anesthesia
Hospitalist
NSICU
Neurosurgery
Physiatry
Neurosurgery
Hospitalist
Hospitalist

## 2019-01-16 NOTE — PROGRESS NOTE ADULT - PROBLEM SELECTOR PLAN 1
afebrile since  bcx ng  ua neg  monitor off abx for now  consider checking dopplers  cxr - LLL opacity, pct is low - incentive spirometry  consider rvp if workup remains neg and fever recurs
concern for urinary source given symptoms - check bladder scan, ua, ucx  f/u bcx  monitor off abx for now  check dopplers if ua neg  cxr unremarkable  consider rvp if workup remains neg
resolved
Day 8 s/p head trauma  admitted with Rt frontal IPH, Gopal SDH, SAH  Neurologically stable  Will monitor patient mental status
afebrile since  bcx ng  ua neg  monitor off abx for now  consider checking dopplers  cxr - LLL opacity, pct is low - incentive spirometry  consider rvp if workup remains neg and fever recurs
bcx ng  ua neg  monitor off abx for now  consider checking dopplers  cxr - LLL opacity, pct is low - incentive spirometry  consider rvp if workup remains neg and fever recurs

## 2019-01-16 NOTE — PROGRESS NOTE ADULT - PROBLEM SELECTOR PLAN 3
remains multifactorial but improving  psych recs appreciated - good response to depakote, also on seroquel
remains multifactorial but improving  psych recs appreciated - good response to depakote, also on seroquel
mgmt per nsgy
remains multifactorial but improving- today more sleepy 2/2 nighttime awakenings and receiving haldol  psych recs appreciated - good response to depakote, also on seroquel  - may taper if patient remains psychiatrically stable
remains multifactorial but improving- today more sleepy 2/2 nighttime awakenings and receiving haldol  psych recs appreciated - good response to depakote, also on seroquel  - may taper if patient remains psychiatrically stable
remains multifactorial, still lethargic, in addition to head trauma, alcohol withdrawal, cva, delirium, also consider metabolic from hypona and toxic from infection  psych recs appreciated  depakote added. on seroquel
resolved  off meds
Continue with DT prophylaxis
remains multifactorial but improving  psych recs appreciated - good response to depakote, also on seroquel  - may taper if patient remains psychiatrically stable

## 2019-01-16 NOTE — PROGRESS NOTE ADULT - PROBLEM SELECTOR PLAN 5
low ciwa scores  prn benzos added but not required  treatment dose thiamine  d/c ciwas monitoring
s/p treatment dose thiamine  d/c'ed ciwa monitoring  SW f/u
low ciwa scores  prn benzos added  treatment dose thiamine
outpatient urology
s/p treatment dose thiamine  d/c'ed ciwa monitoring  SW f/u
DVT prophylaxis: SQ Lovenox
s/p treatment dose thiamine  d/c'ed ciwa monitoring  SW f/u

## 2019-01-16 NOTE — PROGRESS NOTE ADULT - REASON FOR ADMISSION
AMS, IPH
Patient was admitted with traumatic brain injury including right frontal IPH , bilateral subdural hematoma and traumatic subarachnoid hemorrhage.
AMS, IPH

## 2019-01-16 NOTE — PROGRESS NOTE ADULT - PROBLEM SELECTOR PROBLEM 1
Fever, postprocedural
SAH (subarachnoid hemorrhage)
Fever, postprocedural
Fever, postprocedural

## 2019-01-16 NOTE — PROGRESS NOTE ADULT - SUBJECTIVE AND OBJECTIVE BOX
SUBJECTIVE:   Ambulated with PT. intermittent headaches   OVERNIGHT EVENTS: none    Vital Signs Last 24 Hrs  T(C): 36.2 (16 Jan 2019 07:43), Max: 37.1 (16 Jan 2019 00:09)  T(F): 97.1 (16 Jan 2019 07:43), Max: 98.7 (16 Jan 2019 00:09)  HR: 83 (16 Jan 2019 07:43) (83 - 96)  BP: 120/81 (16 Jan 2019 07:43) (106/68 - 120/81)  BP(mean): --  RR: 18 (16 Jan 2019 07:43) (18 - 18)  SpO2: 97% (16 Jan 2019 07:43) (97% - 99%)    PHYSICAL EXAM:    Constitutional: No Acute Distress     Neurological: AOx3, Speech clear Following Commands, Moving all Extremities 5/5.       Pulmonary: Clear to Auscultation, No rales, No rhonchi, No wheezes     Cardiovascular: S1, S2, Regular rate and rhythm     Gastrointestinal: Soft, Non-tender, Non-distended     Extremities: No calf tenderness     LABS:   01-16    137  |  99  |  15  ----------------------------<  94  3.9   |  26  |  0.69    Ca    9.5      16 Jan 2019 05:46        01-15 @ 07:01  -  01-16 @ 07:00  --------------------------------------------------------  IN: 720 mL / OUT: 1100 mL / NET: -380 mL      IMAGING:         MEDICATIONS:  Antibiotics:    Neuro:  acetaminophen   Tablet .. 650 milliGRAM(s) Oral every 6 hours PRN Temp greater or equal to 38C (100.4F), Mild Pain (1 - 3)    amLODIPine   Tablet 10 milliGRAM(s) Oral daily  docusate sodium 100 milliGRAM(s) Oral three times a day  senna 2 Tablet(s) Oral at bedtime  enoxaparin Injectable 40 milliGRAM(s) SubCutaneous <User Schedule>  folic acid 1 milliGRAM(s) Oral daily  influenza   Vaccine 0.5 milliLiter(s) IntraMuscular once  multivitamin 1 Tablet(s) Oral daily  nicotine - 21 mG/24Hr(s) Patch 1 patch Transdermal daily  nicotine - Inhaler 1 Each Inhalation two times a day      DIET:

## 2019-01-16 NOTE — PROGRESS NOTE ADULT - PROBLEM SELECTOR PROBLEM 5
Alcohol abuse
Renal lesion
Prophylactic measure
Alcohol abuse

## 2019-01-17 PROBLEM — Z00.00 ENCOUNTER FOR PREVENTIVE HEALTH EXAMINATION: Status: ACTIVE | Noted: 2019-01-17

## 2019-01-23 ENCOUNTER — APPOINTMENT (OUTPATIENT)
Dept: INTERNAL MEDICINE | Facility: CLINIC | Age: 64
End: 2019-01-23

## 2019-01-29 ENCOUNTER — APPOINTMENT (OUTPATIENT)
Dept: SPINE | Facility: CLINIC | Age: 64
End: 2019-01-29
Payer: MEDICAID

## 2019-01-29 VITALS
RESPIRATION RATE: 15 BRPM | WEIGHT: 130 LBS | SYSTOLIC BLOOD PRESSURE: 121 MMHG | BODY MASS INDEX: 20.89 KG/M2 | HEIGHT: 66 IN | DIASTOLIC BLOOD PRESSURE: 84 MMHG | OXYGEN SATURATION: 97 % | HEART RATE: 88 BPM

## 2019-01-29 DIAGNOSIS — Z78.9 OTHER SPECIFIED HEALTH STATUS: ICD-10-CM

## 2019-01-29 DIAGNOSIS — Z87.828 PERSONAL HISTORY OF OTHER (HEALED) PHYSICAL INJURY AND TRAUMA: ICD-10-CM

## 2019-01-29 DIAGNOSIS — Z86.79 PERSONAL HISTORY OF OTHER DISEASES OF THE CIRCULATORY SYSTEM: ICD-10-CM

## 2019-01-29 DIAGNOSIS — F17.200 NICOTINE DEPENDENCE, UNSPECIFIED, UNCOMPLICATED: ICD-10-CM

## 2019-01-29 DIAGNOSIS — S06.5X9A TRAUMATIC SUBDURAL HEMORRHAGE WITH LOSS OF CONSCIOUSNESS OF UNSPECIFIED DURATION, INITIAL ENCOUNTER: ICD-10-CM

## 2019-01-29 PROCEDURE — 99212 OFFICE O/P EST SF 10 MIN: CPT

## 2019-01-29 RX ORDER — AMLODIPINE BESYLATE 5 MG/1
TABLET ORAL
Refills: 0 | Status: ACTIVE | COMMUNITY

## 2019-01-29 NOTE — REASON FOR VISIT
[Follow-Up: _____] : a [unfilled] follow-up visit [Family Member] : family member [FreeTextEntry1] : 63 year old male who presented to the ED on January 4, 2019 after suffering from a fall and SAH. He was taken to the OR and ashish hole placed for evacuation.  He had an extended hospitalization complicated by  hyponatremia and cerebral contusions.  He is now home and recovered.  He carries a history of ETOH abuse.  Today he has clear speech, ambulating without assistance and c/o maury headache.  No dizziness and returning back to his activity.

## 2019-01-29 NOTE — ASSESSMENT
[FreeTextEntry1] : Assess:\par SAH after fall s/p [employer] [employer] hole  \par ETOH abuse\par \par \par PLAN:\par Follow up with Head CT non contrast\par Return to office in one - two weeks\par Reviewed s/s of increasing bleed and to return to ED if experiencing any red flag signs  (HA, dizzy, vomit, weakness)\par

## 2019-02-02 ENCOUNTER — APPOINTMENT (OUTPATIENT)
Dept: CT IMAGING | Facility: IMAGING CENTER | Age: 64
End: 2019-02-02
Payer: MEDICAID

## 2019-02-02 ENCOUNTER — OUTPATIENT (OUTPATIENT)
Dept: OUTPATIENT SERVICES | Facility: HOSPITAL | Age: 64
LOS: 1 days | End: 2019-02-02
Payer: MEDICAID

## 2019-02-02 DIAGNOSIS — Z00.8 ENCOUNTER FOR OTHER GENERAL EXAMINATION: ICD-10-CM

## 2019-02-02 PROCEDURE — 70450 CT HEAD/BRAIN W/O DYE: CPT | Mod: 26

## 2019-02-02 PROCEDURE — 70450 CT HEAD/BRAIN W/O DYE: CPT

## 2019-02-05 ENCOUNTER — OTHER (OUTPATIENT)
Age: 64
End: 2019-02-05

## 2019-02-06 ENCOUNTER — APPOINTMENT (OUTPATIENT)
Dept: SPINE | Facility: CLINIC | Age: 64
End: 2019-02-06

## 2019-02-12 ENCOUNTER — APPOINTMENT (OUTPATIENT)
Dept: SPINE | Facility: CLINIC | Age: 64
End: 2019-02-12

## 2019-02-19 ENCOUNTER — APPOINTMENT (OUTPATIENT)
Dept: SPINE | Facility: CLINIC | Age: 64
End: 2019-02-19
Payer: MEDICAID

## 2019-02-19 VITALS
RESPIRATION RATE: 15 BRPM | WEIGHT: 130 LBS | SYSTOLIC BLOOD PRESSURE: 114 MMHG | BODY MASS INDEX: 20.89 KG/M2 | HEIGHT: 66 IN | DIASTOLIC BLOOD PRESSURE: 74 MMHG | OXYGEN SATURATION: 98 % | HEART RATE: 81 BPM

## 2019-02-19 PROCEDURE — 99211 OFF/OP EST MAY X REQ PHY/QHP: CPT

## 2019-02-25 NOTE — REASON FOR VISIT
[Follow-Up: _____] : a [unfilled] follow-up visit [FreeTextEntry1] : Asymptomatic today and recent fall with SDH.  [place] hole from 1/4/2019 performed.

## 2019-02-25 NOTE — ASSESSMENT
[FreeTextEntry1] : Assess:\par SDH\par CT shows no increase of SDH and stable\par \par PLAN:\par Follow up PRN

## 2019-06-28 NOTE — PHYSICAL THERAPY INITIAL EVALUATION ADULT - PATIENT PROFILE REVIEW, REHAB EVAL
[FreeTextEntry8] : 24M w/ no significant PMH presents w/ a 3 day hx of L eye irritation. Pt reports gradually worsening unilateral localized lateral eye irritation w/ lateral conjunctival injection. Mild pain, not worse w/ eye movement. Scant clear discharge. No swelling of eyelids. Denies vision changes, fevers. Pt has not tried anything for relief. Fluorecin exam negative. yes

## 2019-08-27 ENCOUNTER — APPOINTMENT (OUTPATIENT)
Dept: SPINE | Facility: CLINIC | Age: 64
End: 2019-08-27

## 2020-03-21 NOTE — PROGRESS NOTE BEHAVIORAL HEALTH - [facility]_PSY_A_CORE
History     Chief Complaint   Patient presents with     Alcohol Intoxication     drank 1-2 shots of vodka this morning, usually drinks about 1/2 liter. hx of seizures last time she was here for withdrawl     UTI     HPI  Ana Murphy is a 23 year old female who presents with polysubstance abuse history and Adderall abuse.   She drank 1-2 shots of vodka this morning usually drinks about 1/2 L of vodka.  History of prior seizures with her withdrawal.  She felt as if she may be withdrawing this morning feeling a sense of malaise, but no obvious significant tremors.     She has been having suprapubic pain, hematuria and dysuria as well as midline low back pain for the last week.  She was seen March 2012 when she had negative Chlamydia gonorrhea testing as well as a urinalysis that was clear other than mild ketones.  I saw the patient on March 7 for elevated LFTs and alcohol intoxication.  She has psychology set up later this month.  She she is not interested in quitting her alcohol use she tends to binge drink for 4 days in a row and then will have a break and then return to drinking.  She denies other drug use.    She denies any current other abdominal pain other than suprapubic pain.  There is no flank pain.  No history of pyelonephritis.  No vaginal discharge bleeding.  No STD history.    Was feeling nauseous this morning and took her Zofran that he had she had available.  Last menstrual period was several days ago and she is not using contraception.  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  Stopped 4/2012       Nondependent cannabis abuse 06/14/2011     Priority: Medium        Past Medical History:    No past medical history on file.    Past Surgical History:    No past surgical history on file.    Family History:    No family history on file.    Social History:  Marital Status:  Single [1]  Social History     Tobacco Use     Smoking status: Current Every Day Smoker     Packs/day: 0.50     Types: Cigarettes     Smokeless tobacco: Never Used   Substance Use Topics     Alcohol use: Yes     Alcohol/week: 21.0 standard drinks     Types: 21 Cans of beer per week     Drug use: Not Currently     Types: Marijuana        Medications:    amphetamine-dextroamphetamine (ADDERALL) 30 MG per tablet  Biotin w/ Vitamins C & E (HAIR/SKIN/NAILS) 1250-7.5-7.5 MCG-MG-UNT CHEW  COPPER PO  dicyclomine (BENTYL) 20 MG tablet  Multiple Vitamins-Minerals (MULTIVITAMIN GUMMIES WOMENS) CHEW  nitroFURantoin macrocrystal-monohydrate (MACROBID) 100 MG capsule  Probiotic Product (HEALTHY COLON PO)  psyllium (METAMUCIL/KONSYL) capsule  Specialty Vitamins Products (GNP CENTURY ENERGY METABOLISM PO)  Wheat Dextrin (BENEFIBER) POWD          Review of Systems   Constitutional: Negative for chills, diaphoresis and fever.   HENT: Negative for ear pain, sinus pressure and sore throat.    Eyes: Negative for visual disturbance.   Respiratory: Negative for cough, shortness of breath and wheezing.    Cardiovascular: Negative for chest pain and palpitations.   Gastrointestinal: Positive for nausea. Negative for abdominal pain, blood in stool, constipation, diarrhea and vomiting.   Genitourinary: Positive for dysuria, frequency and hematuria. Negative for urgency, vaginal bleeding and vaginal discharge.   Skin: Negative for rash.   Neurological: Negative for headaches.   All other systems reviewed and are negative.      Physical Exam   BP: (!) 145/95  Pulse: 116  Temp: 98.4  F (36.9  C)  Resp: 18  Height: 175.3 cm (5' 9\")  Weight: 81.6 kg " (180 lb)(stated)  SpO2: 100 %      Physical Exam  Constitutional:       General: She is in acute distress.      Appearance: She is not toxic-appearing or diaphoretic.   HENT:      Head: Atraumatic.      Nose: Nose normal.      Mouth/Throat:      Mouth: Mucous membranes are moist.   Eyes:      Conjunctiva/sclera: Conjunctivae normal.   Neck:      Musculoskeletal: Neck supple.   Cardiovascular:      Rate and Rhythm: Regular rhythm. Tachycardia present.      Heart sounds: No murmur.   Pulmonary:      Effort: No respiratory distress.      Breath sounds: No stridor. No wheezing or rhonchi.   Abdominal:      General: Abdomen is flat. Bowel sounds are normal. There is no distension.      Palpations: There is no mass.      Tenderness: There is no abdominal tenderness. There is no right CVA tenderness, left CVA tenderness or guarding.   Musculoskeletal:      Right lower leg: No edema.      Left lower leg: No edema.   Skin:     Coloration: Skin is not pale.      Findings: No rash.   Neurological:      General: No focal deficit present.      Mental Status: She is alert.      Cranial Nerves: No cranial nerve deficit.      Sensory: No sensory deficit.      Motor: No weakness.      Coordination: Coordination normal.   Psychiatric:         Mood and Affect: Mood normal.         ED Course        Procedures               Critical Care time:  none               Results for orders placed or performed during the hospital encounter of 03/21/20 (from the past 24 hour(s))   CBC with platelets differential   Result Value Ref Range    WBC 14.9 (H) 4.0 - 11.0 10e9/L    RBC Count 4.83 3.8 - 5.2 10e12/L    Hemoglobin 14.9 11.7 - 15.7 g/dL    Hematocrit 45.2 35.0 - 47.0 %    MCV 94 78 - 100 fl    MCH 30.8 26.5 - 33.0 pg    MCHC 33.0 31.5 - 36.5 g/dL    RDW 13.9 10.0 - 15.0 %    Platelet Count 375 150 - 450 10e9/L    Diff Method Automated Method     % Neutrophils 82.4 %    % Lymphocytes 10.1 %    % Monocytes 5.9 %    % Eosinophils 0.2 %    %  Basophils 0.3 %    % Immature Granulocytes 1.1 %    Nucleated RBCs 0 0 /100    Absolute Neutrophil 12.3 (H) 1.6 - 8.3 10e9/L    Absolute Lymphocytes 1.5 0.8 - 5.3 10e9/L    Absolute Monocytes 0.9 0.0 - 1.3 10e9/L    Absolute Eosinophils 0.0 0.0 - 0.7 10e9/L    Absolute Basophils 0.1 0.0 - 0.2 10e9/L    Abs Immature Granulocytes 0.2 0 - 0.4 10e9/L    Absolute Nucleated RBC 0.0    Comprehensive metabolic panel   Result Value Ref Range    Sodium 134 133 - 144 mmol/L    Potassium 4.1 3.4 - 5.3 mmol/L    Chloride 99 94 - 109 mmol/L    Carbon Dioxide 22 20 - 32 mmol/L    Anion Gap 13 3 - 14 mmol/L    Glucose 69 (L) 70 - 99 mg/dL    Urea Nitrogen 7 7 - 30 mg/dL    Creatinine 0.71 0.52 - 1.04 mg/dL    GFR Estimate >90 >60 mL/min/[1.73_m2]    GFR Estimate If Black >90 >60 mL/min/[1.73_m2]    Calcium 9.2 8.5 - 10.1 mg/dL    Bilirubin Total 0.8 0.2 - 1.3 mg/dL    Albumin 4.4 3.4 - 5.0 g/dL    Protein Total 8.0 6.8 - 8.8 g/dL    Alkaline Phosphatase 62 40 - 150 U/L     (H) 0 - 50 U/L     (H) 0 - 45 U/L   Alcohol level blood   Result Value Ref Range    Ethanol g/dL 0.02 (H) <0.01 g/dL   Salicylate level   Result Value Ref Range    Salicylate Level 2 mg/dL   Acetaminophen level   Result Value Ref Range    Acetaminophen Level <2 mg/L   Lipase   Result Value Ref Range    Lipase 93 73 - 393 U/L   Drug Screen Urine   Result Value Ref Range    Amphetamine Qual Urine Negative NEG^Negative    Barbiturates Qual Urine Negative NEG^Negative    Benzodiazepine Qual Urine Negative NEG^Negative    Cannabinoids Qual Urine Negative NEG^Negative    Cocaine Qual Urine Negative NEG^Negative    Opiates Qualitative Urine Negative NEG^Negative    PCP Qual Urine Negative NEG^Negative   HCG qualitative   Result Value Ref Range    HCG Qualitative Serum Negative NEG^Negative   UA with Microscopic   Result Value Ref Range    Color Urine Yellow     Appearance Urine Cloudy     Glucose Urine Negative NEG^Negative mg/dL    Bilirubin Urine  Negative NEG^Negative    Ketones Urine 20 (A) NEG^Negative mg/dL    Specific Gravity Urine 1.009 1.003 - 1.035    Blood Urine Moderate (A) NEG^Negative    pH Urine 8.0 (H) 5.0 - 7.0 pH    Protein Albumin Urine 30 (A) NEG^Negative mg/dL    Urobilinogen mg/dL 0.0 0.0 - 2.0 mg/dL    Nitrite Urine Negative NEG^Negative    Leukocyte Esterase Urine Large (A) NEG^Negative    Source Midstream Urine     WBC Urine >182 (H) 0 - 5 /HPF    RBC Urine 102 (H) 0 - 2 /HPF    Bacteria Urine Few (A) NEG^Negative /HPF    Squamous Epithelial /HPF Urine 2 (H) 0 - 1 /HPF    Transitional Epi 2 (H) 0 - 1 /HPF    Mucous Urine Present (A) NEG^Negative /LPF    Hyaline Casts 9 (H) 0 - 2 /LPF       Medications - No data to display    Assessments & Plan (with Medical Decision Making)     MDM: Ana Murphy is a 23 year old female who presents with a history of alcohol abuse -with binge drinking and elevated liver function tests, along with today experiencing lower abdominal cramping dysuria hematuria urinary frequency and a urinalysis on evaluation a week ago in clinic that was negative UA than ketones.  At the last visit that I saw her for in early March we discussed the elevated LFTs which continue to be increased in her binge drinking disorder and the need for cessation and treatment.  She subsequently saw Dr. Goldstein her primary provider in clinic who readdressed this with her and she is pending follow-up with mental health.  Distal testing in the clinic included STD testing that was negative for GC chlamydia.   Today she was offered detox but she refuses and will return home.  She is keeping alcohol cessation in mind.  While here she did receive IV fluids, rally pack including thiamine.  LFTs continue to be elevated likely due to her alcohol abuse but the ALT to AST ratio is reversed.  Due to this the last time she was here I did order hepatitis screening and she is negative for hepatitis A hepatitis B hepatitis C mononucleosis.  Tox  screen today continues to be negative.  This will continue to need to be followed and she does need to quit alcohol.    Her urinalysis is positive today large urine white cells and leukocyte esterase in the urine is sent for culture.  Due to an elevated white count she is given 1 dose of IV Rocephin.  There is no arturo flank pain but this could represent pyelonephritis as she does have nausea.  Could be related to her alcohol use.  We discussed oral antibiotics.  She has a sulfa allergy and is previously been on Macrobid with no effect several months ago.  I offered coverage that would have enough renal penetration to cover pyelonephritis between oral ciprofloxacin and oral Augmentin.  She chose the Augmentin once hearing about potential adverse effects on ciprofloxacin.  Will treat for 7 days to cover for pyelonephritis.  Zofran was also prescribed.  She does also mention no stool in the last week without significant other pain and no signs of obstruction.  We discussed bowel regimen as below with precautions for return.      I have reviewed the nursing notes.    I have reviewed the findings, diagnosis, plan and need for follow up with the patient.       New Prescriptions    No medications on file       Final diagnoses:   Acute cystitis with hematuria - wbc increased here today and increased heart rfate.  rocephin given and urine sent for culture.  due to antibiotic allergy to sulfa, and we discussed risk of cipro will use augmentin for 7 days (this length covers for both bladder and kidney infection).  take pyridium for bladder pain   Alcohol consumption binge drinking - consider detox and treatment.   continue home thiamine 100 mg daily   Constipation, unspecified constipation type - use fleets enema x1, followed by mag citrate 1 bottle, then miralax 1 capful daily in 8 oz for one week, then fiber supplement       3/21/2020   [facility] EMERGENCY DEPARTMENT     Alexander Oakley MD  03/21/20 0223     Enhanced supervision

## 2021-04-14 NOTE — PROVIDER CONTACT NOTE (OTHER) - NAME OF MD/NP/PA/DO NOTIFIED:
Patient: Kristina Neil  Unit/Bed: IC01/IC01-01  YOB: 1960  MRN: [id number] Acct: [de-identified]   Admitting Diagnosis: SAH (subarachnoid hemorrhage) ([facility] [I60.9]  Admit Date:  4/7/2021  Hospital Day: 7    Current Medications:    Scheduled Meds:   vancomycin  15 mg/kg Intravenous Q8H    docusate  100 mg Per NG tube BID    senna  5 mL Per NG tube Nightly    clonazePAM  2 mg Oral BID    levetiracetam  500 mg Intravenous Q8H    phenytoin  100 mg Intravenous Q8H    lactated ringers bolus  1,000 mL Intravenous Once    ketorolac  15 mg Intravenous Q6H    methocarbamol  750 mg Intravenous Q8H    albuterol  2.5 mg Nebulization Q6H    cefepime  1,000 mg Intravenous Q12H    vancomycin (VANCOCIN) intermittent dosing (placeholder)   Other RX Placeholder    bisacodyl  10 mg Rectal Every Other Day    glycerin (ADULT)  1 suppository Rectal Every Other Day    sodium chloride flush  10 mL Intravenous 2 times per day     Continuous Infusions:   0.9% NaCl with KCl 20 mEq 75 mL/hr at 04/14/21 0255    sodium chloride       PRN Meds:.hydrALAZINE, labetalol, bisacodyl, sodium chloride flush, sodium chloride, promethazine **OR** ondansetron, acetaminophen  .  0.9% NaCl with KCl 20 mEq 75 mL/hr at 04/14/21 0255    sodium chloride          Recent Labs     04/12/21  0927 04/13/21  0454 04/14/21  0453   WBC 15.0* 13.1* 10.5   HGB 13.1* 12.3* 11.3*   HCT 38.4* 36.5* 33.0*   MCV 88.4 88.9 89.3    206 229     Recent Labs     04/12/21  1804 04/13/21  0454 04/14/21  0453    138 145*   K 3.0* 3.7 3.6    104 111*   CO2 27 23 28   BUN 9 11 16   CREATININE 0.32* 0.29* 0.32*     No results for input(s): AST, ALT, ALB, BILIDIR, BILITOT, ALKPHOS in the last 72 hours. No results for input(s): LIPASE, AMYLASE in the last 72 hours. No results for input(s): PROT, INR in the last 72 hours.     Imaging Results:    Xr Elbow Right (min 3 Views)    Result Date: 4/7/2021  COMPARISON: August 30, 2016 HISTORY: ELIZ Isabel fall PATIENT NAME: GUERRERO PARKST: TECHNIQUE: XR ELBOW RIGHT (MIN 3 VIEWS) FINDINGS: The joint spaces are maintained. Swelling is seen over the dorsum and medial aspect of the elbow. A small calcification is again seen on the dorsal aspect of the elbow near the olecranon. No definite effusion is visualized. No acute fracture    Ct Head Wo Contrast    Result Date: 4/14/2021  CT Brain. Contrast medium:  without contrast.. History:  Seizure. Traumatic brain injury. Technical factors: CT imaging of the brain was obtained and formatted as 5 mm contiguous axial images. 2.5 mm contiguous axial images were obtained through the osseous structures. Sagittal and coronal reconstruction obtained during postprocessing. Comparison:  CT brain, April 7, 8, 9, 11, 2021. Findings: Study limited secondary to motion artifact. Areas of high attenuation within the bilateral frontal lobes, consistent with contusion are again identified and unchanged. The largest, found on the right, measuring 2 cm is stable, and has a low-attenuation rim surrounding it representing edematous change, also stable. Increased attenuation is identified within subarachnoid spaces bilaterally in the frontal lobes, at the level of the vertex, unchanged, as well as posteriorly within the left parietal lobe. No midline shift. No mass affect. No new areas of abnormal attenuation. Ventricles normal in size. Basal cistern and cerebellum without anomaly. Mastoid air cells well pneumatized. Nondisplaced right occipital bone fracture, again identified, unchanged. Impression: Bilateral frontal cerebral contusions, stable. Bilateral subarachnoid hemorrhage as discussed, unchanged. Nondisplaced right occipital fracture, unchanged. All CT scans at this facility use dose modulation, iterative reconstruction, and/or weight based dosing when appropriate to reduce radiation dose to as low as reasonably achievable.     Ct Head Wo Contrast    Result Date: 4/12/2021  EXAMINATION: CT CERVICAL SPINE WO CONTRAST   DATE AND TIME:4/7/2021 6:44 PM CLINICAL HISTORY:Acute posterior neck pain  trauma  COMPARISON: None TECHNIQUE:Helical scanning was performed from the skull base through the remainder of the cervical spine without intravenous contrast. Sagittal and coronal reformats were obtained. The lack of contrast limits CT sensitivity of the soft tissues. All CT scans at this facility use dose modulation, iterative reconstruction, and/or weight based dosing when appropriate to reduce radiation dose to as low as reasonably achievable. Vanna Sida FINDINGS  Severe motion artifact limits exam quality inaccuracy Fracture:Extreme motion artifact motion artifact. No obvious fracture. Consider plain film radiography the C-spine if clinical concerns persist Dense atlas:Dens atlas relationship is unremarkable. Soft tissues:Airway patent. No soft tisssue mass or adenopathy. Other findings: Normal age-related bony changes. Extreme motion artifact. No definite fracture. EXAMINATION: CT CERVICAL SPINE WO CONTRAST  DATE AND TIME:4/7/2021 6:44 PM CLINICAL HISTORY: Severe headache.  trauma  COMPARISON: None TECHNIQUE:Axial CT from skull base to vertex without  contrast..  All CT scans at this facility use dose modulation, iterative reconstruction, and/or weight based dosing when appropriate to reduce radiation dose to as low as reasonably achievable. FINDINGS. Bilateral frontal areas of hemorrhagic contusion with bifrontal frontal subarachnoid and subdural hemorrhage. Small subdural hemorrhages extend along the inner calvarium bilaterally extending approximately 4 mm deep to the inner calvarium. There is no midline shift. Small amount of subdural blood along the anterior falx. Small focus of hemorrhagic contusion along the left anterior temporal lobe. No intraventricular blood. No significant parenchymal edema demonstrated at this time. No other areas of acute hemorrhage. 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 . Globes intact. No acute fracture. Paranasal sinuses and mastoids are clear. These findings were discussed with the ER doctor IMPRESSION: BIFRONTAL HEMORRHAGIC CONTUSIONS WITH SMALL BILATERAL SUBDURAL HEMATOMAS. NO ACUTE FRACTURE     Ct Head Wo Contrast    Result Date: 4/11/2021  CT HEAD WO CONTRAST : 4/11/2021 CLINICAL HISTORY:  Change in mental status in setting of known head bleed . COMPARISON: 4/9/2021. TECHNIQUE: Spiral unenhanced images were obtained of the head, with routine multiplanar reconstructions performed. All CT scans at this facility use dose modulation, iterative reconstruction, and/or weight based dosing when appropriate to reduce radiation dose to as low as reasonably achievable. FINDINGS: A small predominantly low-density left frontoparietal subdural hematoma/hygroma has mildly increased measuring up to 5 mm on the coronal reconstructions. There is no significant midline shift, hydrocephalus, or other significant interval change identified. Bifrontal hemorrhagic contusions anteriorly measuring up to 2 cm on the right and mild subarachnoid hemorrhage appear unchanged. A nondisplaced occipital fracture extending to the foramen magnum to the right of midline is again noted. MILDLY INCREASING UP TO 5 MM LEFT FRONTOPARIETAL SUBDURAL HYGROMA/HEMATOMA. OTHERWISE, STABLE HEAD CT FROM 4/9/2021. Ct Head Wo Contrast    Result Date: 4/9/2021  CT HEAD WO CONTRAST : 4/9/2021 CLINICAL HISTORY:  SDH follow-up . COMPARISON: 4/8/2021. TECHNIQUE: Spiral unenhanced images were obtained of the head, with routine multiplanar reconstructions performed. All CT scans at this facility use dose modulation, iterative reconstruction, and/or weight based dosing when appropriate to reduce radiation dose to as low as reasonably achievable.  FINDINGS: Bifrontal hemorrhagic contusions measuring up to approximately 2 cm on the right, mild predominantly superolateral subarachnoid hemorrhage and up to 3 mm small left frontal subdural hematomas laterally and along the left side of the anterior falx appear unchanged There is no midline shift, hydrocephalus, or other significant changes identified. The mastoid air cells and visualized paranasal sinuses are essentially clear. STABLE ACUTE INTRACRANIAL HEMORRHAGES FROM YESTERDAY. NO EVIDENCE OF INTERVAL COMPLICATION IDENTIFIED. Ct Head Wo Contrast    Result Date: 4/8/2021  CT HEAD WO CONTRAST CLINICAL HISTORY: Bifrontal subdural hematoma with subarachnoid hemorrhage., Follow-up. Comparison: April 7, 2021 1838 hours TECHNIQUE: Multiple unenhanced serial axial images of the brain from the vertex of the skull to the base of the skull were performed. FINDINGS: The ventricles are dilated. Unchanged size configuration. Mo.  No mass. No midline shift. The cisterns are patent. There is developing area of hemorrhagic parenchymal contusion involving the left posterior inferior aspect of the parietal lobe. There is small subarachnoid hemorrhage noted. Again note is made of bifrontal subdural hematomas with extension of the subdural hematoma into the interhemispheric fissure. The left subdural hematoma extends overlying the left frontal lobe to the level of the posterior aspect of the left frontal lobe. There is associated bifrontal hemorrhagic contusions. The multiple parenchymal contusions are best appreciated on the coronal reconstructions series 601 image 34. There is been some interval enlargement of the right as compared to the prior examination. There is now some increasing surrounding vasogenic edema There are now some scattered areas increased attenuation seen within the base of the left frontal lobe series 2 image 15 and small foci within the superior aspect of the left frontal lobe, series 2 image 2523. Either represent areas of hemorrhage. The visualized osseous structures are unremarkable.  The visualized portion of the paranasal sinuses, and mastoids are unremarkable. IMPRESSION 1. INTERVAL DEVELOPMENT OF ACUTE HEMORRHAGIC PARENCHYMAL CONTUSIONS WITH SMALL SUBARACHNOID COMPONENTS IN THE LEFT POSTERIOR INFERIOR PARIETAL REGION. 2. THERE IS SMALL AREAS OF NEW INCREASED ATTENUATION DESCRIBED ABOVE WITHIN THE LEFT FRONTAL LOBE. FINDINGS REPRESENT SMALL AREAS OF PARENCHYMAL CONTUSION, HEMORRHAGE. 3. BIFRONTAL SUBDURAL HEMATOMAS WITH ASSOCIATED PARENCHYMAL HEMORRHAGIC CONTUSIONS AS DESCRIBED ABOVE. RECOMMEND MRI TO FURTHER EVALUATE All CT scans at this facility use dose modulation, iterative reconstruction, and/or weight based dosing when appropriate to reduce radiation dose to as low as reasonably achievable. Ct Head Wo Contrast    Result Date: 4/7/2021  COMPARISON: September 18, 2020. HISTORY:  fell off a wheel chair slight hematoma  occiput TECHNIQUE: The study was performed without contrast FINDINGS: In the bifrontal regions there is hyperdensity seen in the sulci and falx. The ventricular system is midline with no evidence for hydrocephalus. There is soft tissue prominence also seen The visualized paranasal sinuses and orbits appear unremarkable. No lytic or blastic changes seen in the calvarium. Bifrontal subarachnoid hemorrhage is seen. This was discussed with Dr. Phoebe Espinoza on today's date at 1:35 All CT scans at this facility use dose modulation, iterative reconstruction, and/or weight based dosing when appropriate to reduce radiation dose to as low as reasonably achievable. Cta Neck W Wo Contrast    Result Date: 4/12/2021  EXAMINATION: CTA neck CLINICAL HISTORY: Subarachnoid hemorrhage FINDINGS: Study was performed after the patient received 300 mL of Isovue-300. 2-D and 3-D reconstructions of the extracranial carotid vasculature obtained from the arch to the skull base. Right carotid vasculature: Right common carotid artery, bifurcation, right internal and external carotid arteries widely patent free of significant stenosis. Left carotid vasculature: Left common carotid artery, bifurcation, left internal and external carotid arteries widely patent from the arch to the skull base. Vertebral arteries: Left vertebral artery dominant vessel. No focal stenosis in the vertebral vasculature. Nondisplaced fracture through the posterior right occipital bone just off the midline noted. Severe degenerative changes overlie the mid cervical spine most prominent at the C4-C5 level subjacent to the fused C5-C7 vertebral bodies. UNREMARKABLE CTA OF THE NECK. NASCET CRITERIA EMPLOYED. Ct Cervical Spine Wo Contrast    Result Date: 4/12/2021  EXAMINATION: CT CERVICAL SPINE WO CONTRAST   DATE AND TIME:4/7/2021 6:44 PM CLINICAL HISTORY:Acute posterior neck pain  trauma  COMPARISON: None TECHNIQUE:Helical scanning was performed from the skull base through the remainder of the cervical spine without intravenous contrast. Sagittal and coronal reformats were obtained. The lack of contrast limits CT sensitivity of the soft tissues. All CT scans at this facility use dose modulation, iterative reconstruction, and/or weight based dosing when appropriate to reduce radiation dose to as low as reasonably achievable. Janna Brittle FINDINGS  Severe motion artifact limits exam quality inaccuracy Fracture:Extreme motion artifact motion artifact. No obvious fracture. Consider plain film radiography the C-spine if clinical concerns persist Dense atlas:Dens atlas relationship is unremarkable. Soft tissues:Airway patent. No soft tisssue mass or adenopathy. Other findings: Normal age-related bony changes. Extreme motion artifact. No definite fracture.    EXAMINATION: CT CERVICAL SPINE WO CONTRAST  DATE AND TIME:4/7/2021 6:44 PM CLINICAL HISTORY: Severe headache.  trauma  COMPARISON: None TECHNIQUE:Axial CT from skull base to vertex without  contrast..  All CT scans at this facility use dose modulation, iterative reconstruction, and/or weight based dosing when appropriate to reduce radiation dose to as low as reasonably achievable. FINDINGS. Bilateral frontal areas of hemorrhagic contusion with bifrontal frontal subarachnoid and subdural hemorrhage. Small subdural hemorrhages extend along the inner calvarium bilaterally extending approximately 4 mm deep to the inner calvarium. There is no midline shift. Small amount of subdural blood along the anterior falx. Small focus of hemorrhagic contusion along the left anterior temporal lobe. No intraventricular blood. No significant parenchymal edema demonstrated at this time. No other areas of acute hemorrhage. 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 . Globes intact. No acute fracture. Paranasal sinuses and mastoids are clear. These findings were discussed with the ER doctor IMPRESSION: BIFRONTAL HEMORRHAGIC CONTUSIONS WITH SMALL BILATERAL SUBDURAL HEMATOMAS. NO ACUTE FRACTURE     Xr Chest Portable    Result Date: 4/14/2021  EXAMINATION: XR CHEST PORTABLE CLINICAL HISTORY: PNEUMONIA COMPARISONS: CHEST RADIOGRAPH, APRIL 13, 2021 FINDINGS: Feeding tube identified with catheter in proximal gastric body. Osseous structures intact. Cardiopericardial silhouette normal. Bilateral blunting costophrenic angles, with ill-defined areas increase opacity bilateral mid, and lower lungs, greater on right, unchanged. No change, bilateral pneumonia, with bilateral pleural effusion     Xr Chest Portable    Result Date: 4/13/2021  XR CHEST PORTABLE Clinical History:  Subarachnoid hemorrhage. Corpak evaluation. Comparison:  4/13/2021 at 09 19  RESULT: Interval further distal advancement of the enteric tube, now projecting over the left upper quadrant. Patchy opacities within the lung bases, right greater than left, unchanged. Small right pleural effusion, unchanged. No large left pleural effusion. No pneumothorax. Stable cardiomediastinal silhouette. No acute osseous findings. Degenerative changes left shoulder.      Interval further distal abnormality. Degenerative changes of the spine. No significant interval change in bilateral infiltrate. Xr Chest Portable    Result Date: 4/12/2021  EXAMINATION: XR CHEST PORTABLE CLINICAL HISTORY: FEEDING TUBE PLACEMENT COMPARISONS: CHEST RADIOGRAPH APRIL 11, 2021 FINDINGS: No feeding tube is identified on the current study. Thoracic scoliosis convexity to right apex T6-T7. Interval increase in opacity found in right mid and right lower lung, with blunting right costophrenic angle. Portion of right cardiac silhouette, and right diaphragm now obscured. Ill-defined area of increased opacity visualized left mid lung, with surgical clips also visualized left midlung zone, unchanged. INTERVAL INCREASE IN RIGHT MIDDLE AND RIGHT LOWER LOBE PNEUMONIA VERSUS ASPIRATION. SMALL RIGHT PLEURAL EFFUSION. LEFT MIDLUNG ATELECTASIS/PNEUMONIA VERSUS POSTSURGICAL CHANGE. Xr Chest Portable    Result Date: 4/12/2021  EXAMINATION: XR CHEST PORTABLE REASON FOR EXAM: tachypnea FINDINGS: Frontal view of the chest reveals patchy subsegmental infiltrate developing at the right base suspicious for pneumonia. There is bibasilar parenchymal scarring noted as well. Heart normal in size. Central pulmonary vasculature within normal limits. RIGHT LOWER LOBE PNEUMONIA/ATELECTASIS. /66   Pulse 103   Temp 98.6 °F (37 °C) (Oral)   Resp 20   Ht 6' 1\" (1.854 m)   Wt 137 lb 9.1 oz (62.4 kg)   SpO2 93%   BMI 18.15 kg/m²     Events from today noted. Notified for the first time this morning    Patient was seen by me in ICU. Status post seizure. Postictal status. .    Seizure treatment per neurology. Repeat CT today was reviewed by me. Impression: Bifrontal contrecoup traumatic subdural intraparenchymal hematoma.     Patient pending transfer to [facility] per patient's family request.

## 2022-12-29 NOTE — CONSULT NOTE ADULT - PROBLEM SELECTOR PROBLEM 4
Renal lesion W Plasty Text: The lesion was extirpated to the level of the fat with a #15 scalpel blade.  Given the location of the defect, shape of the defect and the proximity to free margins a W-plasty was deemed most appropriate for repair.  Using a sterile surgical marker, the appropriate transposition arms of the W-plasty were drawn incorporating the defect and placing the expected incisions within the relaxed skin tension lines where possible.    The area thus outlined was incised deep to adipose tissue with a #15 scalpel blade.  The skin margins were undermined to an appropriate distance in all directions utilizing iris scissors.  The opposing transposition arms were then transposed into place in opposite direction and anchored with interrupted buried subcutaneous sutures.

## 2023-01-20 NOTE — PROGRESS NOTE ADULT - ASSESSMENT
63M w/alcohol abuse has traumatic SDH SAH brain contusion s/p ICP bolt, intubated, now stable on neurosurgical floor, No

## 2025-05-14 NOTE — PATIENT PROFILE ADULT - NSPROPTRIGHTBILLOFRIGHTS_GEN_A_NUR
For information on Fall & Injury Prevention, visit: [email or web address] OR  [email or web address] OR  https://www.cdc.gov/steadi/patient.html
patient representative